# Patient Record
Sex: MALE | Race: WHITE | Employment: FULL TIME | URBAN - NONMETROPOLITAN AREA
[De-identification: names, ages, dates, MRNs, and addresses within clinical notes are randomized per-mention and may not be internally consistent; named-entity substitution may affect disease eponyms.]

---

## 2017-06-29 ENCOUNTER — OFFICE VISIT (OUTPATIENT)
Dept: URGENT CARE | Age: 47
End: 2017-06-29
Payer: MEDICAID

## 2017-06-29 VITALS
HEIGHT: 70 IN | OXYGEN SATURATION: 97 % | RESPIRATION RATE: 20 BRPM | TEMPERATURE: 98.5 F | HEART RATE: 106 BPM | DIASTOLIC BLOOD PRESSURE: 108 MMHG | WEIGHT: 202.4 LBS | SYSTOLIC BLOOD PRESSURE: 168 MMHG | BODY MASS INDEX: 28.98 KG/M2

## 2017-06-29 DIAGNOSIS — J06.9 VIRAL URI WITH COUGH: Primary | ICD-10-CM

## 2017-06-29 PROCEDURE — 99213 OFFICE O/P EST LOW 20 MIN: CPT | Performed by: NURSE PRACTITIONER

## 2017-06-29 RX ORDER — FLUTICASONE PROPIONATE 50 MCG
1 SPRAY, SUSPENSION (ML) NASAL DAILY
Qty: 1 BOTTLE | Refills: 0 | Status: SHIPPED | OUTPATIENT
Start: 2017-06-29 | End: 2018-01-09 | Stop reason: CLARIF

## 2017-06-29 RX ORDER — BENZONATATE 100 MG/1
100 CAPSULE ORAL 3 TIMES DAILY PRN
Qty: 21 CAPSULE | Refills: 0 | Status: SHIPPED | OUTPATIENT
Start: 2017-06-29 | End: 2017-07-06

## 2017-06-29 RX ORDER — LORATADINE 10 MG/1
10 TABLET ORAL DAILY
Qty: 30 TABLET | Refills: 0 | Status: SHIPPED | OUTPATIENT
Start: 2017-06-29 | End: 2022-01-17

## 2017-06-29 ASSESSMENT — ENCOUNTER SYMPTOMS
SORE THROAT: 1
SINUS PRESSURE: 1
COUGH: 1
RHINORRHEA: 1

## 2018-01-09 ENCOUNTER — OFFICE VISIT (OUTPATIENT)
Dept: URGENT CARE | Age: 48
End: 2018-01-09
Payer: MEDICAID

## 2018-01-09 VITALS
DIASTOLIC BLOOD PRESSURE: 80 MMHG | TEMPERATURE: 97.5 F | HEART RATE: 99 BPM | SYSTOLIC BLOOD PRESSURE: 134 MMHG | OXYGEN SATURATION: 98 % | RESPIRATION RATE: 20 BRPM | HEIGHT: 70 IN | BODY MASS INDEX: 29.49 KG/M2 | WEIGHT: 206 LBS

## 2018-01-09 DIAGNOSIS — J01.10 ACUTE NON-RECURRENT FRONTAL SINUSITIS: Primary | ICD-10-CM

## 2018-01-09 PROCEDURE — 99213 OFFICE O/P EST LOW 20 MIN: CPT | Performed by: NURSE PRACTITIONER

## 2018-01-09 RX ORDER — FLUTICASONE PROPIONATE 50 MCG
1 SPRAY, SUSPENSION (ML) NASAL DAILY
Qty: 1 BOTTLE | Refills: 3 | Status: SHIPPED | OUTPATIENT
Start: 2018-01-09 | End: 2022-01-17

## 2018-01-09 RX ORDER — METHYLPREDNISOLONE 4 MG/1
TABLET ORAL
Qty: 1 KIT | Refills: 0 | Status: SHIPPED | OUTPATIENT
Start: 2018-01-09 | End: 2018-01-15

## 2018-01-09 ASSESSMENT — ENCOUNTER SYMPTOMS
TROUBLE SWALLOWING: 0
SINUS PRESSURE: 1
VOICE CHANGE: 0
ALLERGIC/IMMUNOLOGIC NEGATIVE: 1
COUGH: 1
GASTROINTESTINAL NEGATIVE: 1
EYES NEGATIVE: 1
SHORTNESS OF BREATH: 0
RHINORRHEA: 0
SORE THROAT: 0

## 2018-01-09 NOTE — PROGRESS NOTES
[address] [facility] E CARE  [address] [place] [address]  [address] [place] [id number]  Dept: [phone]  Loc: [phone]    Izabel Bishop is a 52 y.o. male who presents today for his medical conditions/complaints as noted below. Izabel Bishop is c/o of Cough (x2 weeks)        HPI:     HPI   Pt presents to clinic with c/o sinus congestion for a week. Denies face pain, h/a. States he has tried dayquil and mucinex. Denies fever, SOB, sore throat. Denies treatment. Denies any other symptoms. No results found for this visit on 01/09/18. History reviewed. No pertinent past medical history. Past Surgical History:   Procedure Laterality Date    WISDOM TOOTH EXTRACTION         Family History   Problem Relation Age of Onset    Breast Cancer Mother     Lung Cancer Maternal Uncle     Diabetes Maternal Grandmother     Brain Cancer Paternal Grandfather        Social History   Substance Use Topics    Smoking status: Current Every Day Smoker     Packs/day: 0.50     Years: 7.00     Types: Cigarettes    Smokeless tobacco: Never Used    Alcohol use Yes      Comment: Rare      Current Outpatient Prescriptions   Medication Sig Dispense Refill    methylPREDNISolone (MEDROL, RAGHU,) 4 MG tablet Take by mouth. 1 kit 0    fluticasone (FLONASE) 50 MCG/ACT nasal spray 1 spray by Nasal route daily 1 Bottle 3    loratadine (CLARITIN) 10 MG tablet Take 1 tablet by mouth daily 30 tablet 0     No current facility-administered medications for this visit.       Allergies   Allergen Reactions    Amoxicillin Nausea And Vomiting    Other      Pain medication unknown to patient       Health Maintenance   Topic Date Due    HIV screen  01/20/1985    DTaP/Tdap/Td vaccine (1 - Tdap) 01/20/1989    Pneumococcal med risk (1 of 1 - PPSV23) 01/20/1989    Lipid screen  01/20/2010    Diabetes screen  01/20/2010    Flu vaccine (1) 09/01/2017       Subjective:      Review of Systems time.   Skin: Skin is warm and intact. No rash noted. Psychiatric: He has a normal mood and affect. His speech is normal and behavior is normal. Judgment and thought content normal. Cognition and memory are normal.   Vitals reviewed. /80   Pulse 99   Temp 97.5 °F (36.4 °C) (Oral)   Resp 20   Ht 5' 10\" (1.778 m)   Wt 206 lb (93.4 kg)   SpO2 98%   BMI 29.56 kg/m²     Assessment:      1. Acute non-recurrent frontal sinusitis  methylPREDNISolone (MEDROL, RAGHU,) 4 MG tablet    fluticasone (FLONASE) 50 MCG/ACT nasal spray       Plan:    No orders of the defined types were placed in this encounter. No Follow-up on file. No orders of the defined types were placed in this encounter. Orders Placed This Encounter   Medications    methylPREDNISolone (MEDROL, RAGHU,) 4 MG tablet     Sig: Take by mouth. Dispense:  1 kit     Refill:  0    fluticasone (FLONASE) 50 MCG/ACT nasal spray     Si spray by Nasal route daily     Dispense:  1 Bottle     Refill:  3       Patient given educational materials - see patient instructions. Discussed use, benefit, and side effects of prescribed medications. All patient questions answered. Pt voiced understanding. Reviewed health maintenance. Instructed to continue current medications, diet and exercise. Patient agreed with treatment plan. Follow up as directed. There are no Patient Instructions on file for this visit.       Electronically signed by Nolvia Overton CNP on 2018 at 5:45 PM

## 2021-10-04 ENCOUNTER — APPOINTMENT (OUTPATIENT)
Dept: CT IMAGING | Age: 51
End: 2021-10-04
Payer: MEDICAID

## 2021-10-04 ENCOUNTER — APPOINTMENT (OUTPATIENT)
Dept: GENERAL RADIOLOGY | Age: 51
End: 2021-10-04
Payer: MEDICAID

## 2021-10-04 ENCOUNTER — HOSPITAL ENCOUNTER (EMERGENCY)
Age: 51
Discharge: HOME | End: 2021-10-04
Attending: PEDIATRICS
Payer: MEDICAID

## 2021-10-04 ENCOUNTER — OFFICE VISIT (OUTPATIENT)
Dept: URGENT CARE | Age: 51
End: 2021-10-04

## 2021-10-04 VITALS
BODY MASS INDEX: 30.03 KG/M2 | TEMPERATURE: 97.6 F | RESPIRATION RATE: 22 BRPM | HEART RATE: 73 BPM | SYSTOLIC BLOOD PRESSURE: 198 MMHG | DIASTOLIC BLOOD PRESSURE: 105 MMHG | HEIGHT: 70 IN | OXYGEN SATURATION: 98 % | WEIGHT: 209.8 LBS

## 2021-10-04 VITALS
TEMPERATURE: 98.6 F | SYSTOLIC BLOOD PRESSURE: 175 MMHG | HEART RATE: 71 BPM | OXYGEN SATURATION: 95 % | DIASTOLIC BLOOD PRESSURE: 106 MMHG | HEIGHT: 70 IN | BODY MASS INDEX: 29.92 KG/M2 | WEIGHT: 209 LBS | RESPIRATION RATE: 16 BRPM

## 2021-10-04 DIAGNOSIS — R51.9 HEADACHE IN BACK OF HEAD: Primary | ICD-10-CM

## 2021-10-04 DIAGNOSIS — I10 HYPERTENSION, UNSPECIFIED TYPE: Primary | ICD-10-CM

## 2021-10-04 DIAGNOSIS — N20.1 URETEROLITHIASIS: ICD-10-CM

## 2021-10-04 LAB
ALBUMIN SERPL-MCNC: 4.7 G/DL (ref 3.5–5.2)
ALP BLD-CCNC: 78 U/L (ref 40–130)
ALT SERPL-CCNC: 50 U/L (ref 5–41)
ANION GAP SERPL CALCULATED.3IONS-SCNC: 8 MMOL/L (ref 7–19)
AST SERPL-CCNC: 26 U/L (ref 5–40)
BACTERIA: NEGATIVE /HPF
BASOPHILS ABSOLUTE: 0.1 K/UL (ref 0–0.2)
BASOPHILS RELATIVE PERCENT: 0.8 % (ref 0–1)
BILIRUB SERPL-MCNC: 0.5 MG/DL (ref 0.2–1.2)
BILIRUBIN URINE: NEGATIVE
BLOOD, URINE: ABNORMAL
BUN BLDV-MCNC: 17 MG/DL (ref 6–20)
CALCIUM SERPL-MCNC: 9.4 MG/DL (ref 8.6–10)
CHLORIDE BLD-SCNC: 103 MMOL/L (ref 98–111)
CLARITY: CLEAR
CO2: 27 MMOL/L (ref 22–29)
COLOR: YELLOW
CREAT SERPL-MCNC: 0.8 MG/DL (ref 0.5–1.2)
CRYSTALS, UA: ABNORMAL /HPF
EKG P AXIS: 4 DEGREES
EKG P-R INTERVAL: 126 MS
EKG Q-T INTERVAL: 378 MS
EKG QRS DURATION: 112 MS
EKG QTC CALCULATION (BAZETT): 413 MS
EKG T AXIS: 38 DEGREES
EOSINOPHILS ABSOLUTE: 0.5 K/UL (ref 0–0.6)
EOSINOPHILS RELATIVE PERCENT: 4 % (ref 0–5)
EPITHELIAL CELLS, UA: 0 /HPF (ref 0–5)
GFR AFRICAN AMERICAN: >59
GFR NON-AFRICAN AMERICAN: >60
GLUCOSE BLD-MCNC: 98 MG/DL (ref 74–109)
GLUCOSE URINE: NEGATIVE MG/DL
HCT VFR BLD CALC: 46.6 % (ref 42–52)
HEMOGLOBIN: 16.1 G/DL (ref 14–18)
HYALINE CASTS: 1 /HPF (ref 0–8)
IMMATURE GRANULOCYTES #: 0.1 K/UL
KETONES, URINE: NEGATIVE MG/DL
LEUKOCYTE ESTERASE, URINE: ABNORMAL
LYMPHOCYTES ABSOLUTE: 3.2 K/UL (ref 1.1–4.5)
LYMPHOCYTES RELATIVE PERCENT: 25.5 % (ref 20–40)
MCH RBC QN AUTO: 32.3 PG (ref 27–31)
MCHC RBC AUTO-ENTMCNC: 34.5 G/DL (ref 33–37)
MCV RBC AUTO: 93.6 FL (ref 80–94)
MONOCYTES ABSOLUTE: 1.2 K/UL (ref 0–0.9)
MONOCYTES RELATIVE PERCENT: 9.4 % (ref 0–10)
NEUTROPHILS ABSOLUTE: 7.4 K/UL (ref 1.5–7.5)
NEUTROPHILS RELATIVE PERCENT: 59.7 % (ref 50–65)
NITRITE, URINE: NEGATIVE
PDW BLD-RTO: 12 % (ref 11.5–14.5)
PH UA: 6.5 (ref 5–8)
PLATELET # BLD: 312 K/UL (ref 130–400)
PMV BLD AUTO: 9.3 FL (ref 9.4–12.4)
POTASSIUM REFLEX MAGNESIUM: 4.1 MMOL/L (ref 3.5–5)
PRO-BNP: 253 PG/ML (ref 0–900)
PROTEIN UA: ABNORMAL MG/DL
RBC # BLD: 4.98 M/UL (ref 4.7–6.1)
RBC UA: 10 /HPF (ref 0–4)
REASON FOR REJECTION: NORMAL
REJECTED TEST: NORMAL
SODIUM BLD-SCNC: 138 MMOL/L (ref 136–145)
SPECIFIC GRAVITY UA: 1.02 (ref 1–1.03)
TOTAL PROTEIN: 7.1 G/DL (ref 6.6–8.7)
TROPONIN: <0.01 NG/ML (ref 0–0.03)
UROBILINOGEN, URINE: 1 E.U./DL
WBC # BLD: 12.4 K/UL (ref 4.8–10.8)
WBC UA: 6 /HPF (ref 0–5)

## 2021-10-04 PROCEDURE — 70450 CT HEAD/BRAIN W/O DYE: CPT

## 2021-10-04 PROCEDURE — 99283 EMERGENCY DEPT VISIT LOW MDM: CPT

## 2021-10-04 PROCEDURE — 81001 URINALYSIS AUTO W/SCOPE: CPT

## 2021-10-04 PROCEDURE — 71046 X-RAY EXAM CHEST 2 VIEWS: CPT

## 2021-10-04 PROCEDURE — 6370000000 HC RX 637 (ALT 250 FOR IP): Performed by: PEDIATRICS

## 2021-10-04 PROCEDURE — 84484 ASSAY OF TROPONIN QUANT: CPT

## 2021-10-04 PROCEDURE — 93010 ELECTROCARDIOGRAM REPORT: CPT | Performed by: INTERNAL MEDICINE

## 2021-10-04 PROCEDURE — 99999 PR OFFICE/OUTPT VISIT,PROCEDURE ONLY: CPT | Performed by: NURSE PRACTITIONER

## 2021-10-04 PROCEDURE — 93005 ELECTROCARDIOGRAM TRACING: CPT | Performed by: PEDIATRICS

## 2021-10-04 PROCEDURE — 83880 ASSAY OF NATRIURETIC PEPTIDE: CPT

## 2021-10-04 PROCEDURE — 74176 CT ABD & PELVIS W/O CONTRAST: CPT

## 2021-10-04 PROCEDURE — 80053 COMPREHEN METABOLIC PANEL: CPT

## 2021-10-04 PROCEDURE — 85025 COMPLETE CBC W/AUTO DIFF WBC: CPT

## 2021-10-04 PROCEDURE — 36415 COLL VENOUS BLD VENIPUNCTURE: CPT

## 2021-10-04 RX ORDER — TAMSULOSIN HYDROCHLORIDE 0.4 MG/1
0.4 CAPSULE ORAL DAILY
Qty: 20 CAPSULE | Refills: 0 | Status: SHIPPED | OUTPATIENT
Start: 2021-10-04

## 2021-10-04 RX ORDER — CLONIDINE HYDROCHLORIDE 0.1 MG/1
0.1 TABLET ORAL ONCE
Status: COMPLETED | OUTPATIENT
Start: 2021-10-04 | End: 2021-10-04

## 2021-10-04 RX ORDER — LISINOPRIL 10 MG/1
10 TABLET ORAL DAILY
Qty: 30 TABLET | Refills: 0 | Status: SHIPPED | OUTPATIENT
Start: 2021-10-04 | End: 2022-01-17

## 2021-10-04 RX ADMIN — CLONIDINE HYDROCHLORIDE 0.1 MG: 0.1 TABLET ORAL at 13:06

## 2021-10-04 ASSESSMENT — ENCOUNTER SYMPTOMS
COLOR CHANGE: 0
NAUSEA: 0
VOMITING: 0
EYE DISCHARGE: 0
COUGH: 0
RHINORRHEA: 0
ABDOMINAL PAIN: 0
BACK PAIN: 0
SHORTNESS OF BREATH: 1

## 2021-10-04 ASSESSMENT — PAIN SCALES - GENERAL: PAINLEVEL_OUTOF10: 4

## 2021-10-04 NOTE — PROGRESS NOTES
Pt presents with /105 with headache, dizziness, face tingling. Advised ER for further eval. Pt agreed to plan. Left in stable condition. Refused ambulance.

## 2021-10-04 NOTE — ED PROVIDER NOTES
Negative for congestion and rhinorrhea. Eyes: Negative for discharge. Respiratory: Positive for shortness of breath (Patient states he is using 4 pillows because he gets short of breath at night.). Negative for cough. Cardiovascular: Negative for chest pain and palpitations. Gastrointestinal: Negative for abdominal pain, nausea and vomiting. Genitourinary: Negative for difficulty urinating and dysuria. Musculoskeletal: Positive for neck pain. Negative for back pain. Bilateral upper extremity pain intermittently   Skin: Negative for color change and pallor. Neurological: Positive for headaches. Negative for syncope, facial asymmetry, speech difficulty, weakness, light-headedness and numbness. Psychiatric/Behavioral: Negative for agitation and decreased concentration. All other systems reviewed and are negative. PAST MEDICALHISTORY   No past medical history on file.       SURGICAL HISTORY       Past Surgical History:   Procedure Laterality Date    WISDOM TOOTH EXTRACTION           CURRENT MEDICATIONS     Discharge Medication List as of 10/4/2021  5:04 PM      CONTINUE these medications which have NOT CHANGED    Details   fluticasone (FLONASE) 50 MCG/ACT nasal spray 1 spray by Nasal route daily, Disp-1 Bottle, R-3Normal      loratadine (CLARITIN) 10 MG tablet Take 1 tablet by mouth daily, Disp-30 tablet, R-0Normal             ALLERGIES     Amoxicillin and Other    FAMILY HISTORY       Family History   Problem Relation Age of Onset    Breast Cancer Mother     Lung Cancer Maternal Uncle     Diabetes Maternal Grandmother     Brain Cancer Paternal Grandfather           SOCIAL HISTORY       Social History     Socioeconomic History    Marital status:      Spouse name: Not on file    Number of children: Not on file    Years of education: Not on file    Highest education level: Not on file   Occupational History    Not on file   Tobacco Use    Smoking status: Current Every Day Smoker     Packs/day: 0.50     Years: 7.00     Pack years: 3.50     Types: Cigarettes    Smokeless tobacco: Never Used   Substance and Sexual Activity    Alcohol use: Yes     Comment: Rare    Drug use: Yes     Types: Marijuana    Sexual activity: Not on file   Other Topics Concern    Not on file   Social History Narrative    Not on file     Social Determinants of Health     Financial Resource Strain:     Difficulty of Paying Living Expenses:    Food Insecurity:     Worried About Running Out of Food in the Last Year:     Ran Out of Food in the Last Year:    Transportation Needs:     Lack of Transportation (Medical):  Lack of Transportation (Non-Medical):    Physical Activity:     Days of Exercise per Week:     Minutes of Exercise per Session:    Stress:     Feeling of Stress :    Social Connections:     Frequency of Communication with Friends and Family:     Frequency of Social Gatherings with Friends and Family:     Attends Jew Services:     Active Member of Clubs or Organizations:     Attends Club or Organization Meetings:     Marital Status:    Intimate Partner Violence:     Fear of Current or Ex-Partner:     Emotionally Abused:     Physically Abused:     Sexually Abused:        SCREENINGS             PHYSICAL EXAM    (up to 7 for level 4, 8 or more for level 5)     ED Triage Vitals [10/04/21 1152]   BP Temp Temp Source Pulse Resp SpO2 Height Weight   (!) 207/134 98.6 °F (37 °C) Oral 71 16 95 % 5' 10\" (1.778 m) 209 lb (94.8 kg)       Physical Exam  Vitals and nursing note reviewed. Constitutional:       General: He is not in acute distress. Appearance: Normal appearance. HENT:      Head: Normocephalic and atraumatic. Right Ear: External ear normal.      Left Ear: External ear normal.      Nose: Nose normal.      Mouth/Throat:      Mouth: Mucous membranes are moist.      Pharynx: Oropharynx is clear. No oropharyngeal exudate.    Eyes:      General: No scleral icterus. Conjunctiva/sclera: Conjunctivae normal.      Pupils: Pupils are equal, round, and reactive to light. Cardiovascular:      Rate and Rhythm: Normal rate and regular rhythm. Pulses: Normal pulses. Heart sounds: Normal heart sounds. Pulmonary:      Effort: Pulmonary effort is normal.      Breath sounds: Normal breath sounds. Abdominal:      General: Bowel sounds are normal.      Palpations: Abdomen is soft. Tenderness: There is no abdominal tenderness. There is no guarding. Musculoskeletal:         General: No tenderness or deformity. Cervical back: Neck supple. No rigidity. Right lower leg: No edema. Left lower leg: No edema. Skin:     General: Skin is warm and dry. Capillary Refill: Capillary refill takes less than 2 seconds. Coloration: Skin is not jaundiced. Neurological:      General: No focal deficit present. Mental Status: He is alert and oriented to person, place, and time. Mental status is at baseline. Coordination: Coordination normal.   Psychiatric:         Mood and Affect: Mood normal.         Behavior: Behavior normal.         DIAGNOSTIC RESULTS     EKG: All EKG's areinterpreted by the Emergency Department Physician who either signs or Co-signs this chart in the absence of a cardiologist.    EKG dated 10/4/2021 at 12:19 PM: Normal sinus rhythm, rate 81. Probable left ventricular hypertrophy. Left anterior fascicular block. T wave flattening in 3 and aVF. Early repolarization in V1 through V5.    RADIOLOGY:  Non-plain film images such as CT, Ultrasound and MRI are read by the radiologist. Plain radiographic images are visualized and preliminarily interpreted bythe emergency physician with the below findings:          CT ABDOMEN PELVIS WO CONTRAST Additional Contrast? None   Final Result   1.  A 5 x 4 mm right pelvic calcification appears to be within the   distal right ureter though there is no ureteral dilation or hydronephrosis. 2. Otherwise no acute abnormality is seen within the abdomen and   pelvis. Signed by Dr Javier Aviles      XR CHEST (2 VW)   Final Result   No active cardiopulmonary disease. Signed by Dr Diane Flores Contrast   Final Result   No acute intracranial abnormality. Signed by Dr Lobo Colon:  Iris Cords MG FOR LOW K - Abnormal; Notable for the following components:       Result Value    ALT 50 (*)     All other components within normal limits   URINE RT REFLEX TO CULTURE - Abnormal; Notable for the following components:    Blood, Urine SMALL (*)     Protein, UA TRACE (*)     Leukocyte Esterase, Urine SMALL (*)     All other components within normal limits   CBC WITH AUTO DIFFERENTIAL - Abnormal; Notable for the following components:    WBC 12.4 (*)     MCH 32.3 (*)     MPV 9.3 (*)     Monocytes Absolute 1.20 (*)     All other components within normal limits    Narrative:     RECOLLECT   MICROSCOPIC URINALYSIS - Abnormal; Notable for the following components:    Bacteria, UA NEGATIVE (*)     Crystals, UA NEG (*)     WBC, UA 6 (*)     RBC, UA 10 (*)     All other components within normal limits   TROPONIN   SPECIMEN REJECTION   BRAIN NATRIURETIC PEPTIDE       All other labs were within normal range or not returned as of this dictation. EMERGENCY DEPARTMENT COURSE and DIFFERENTIAL DIAGNOSIS/MDM:   Vitals:    Vitals:    10/04/21 1152 10/04/21 1306   BP: (!) 207/134 (!) 175/106   Pulse: 71    Resp: 16    Temp: 98.6 °F (37 °C)    TempSrc: Oral    SpO2: 95%    Weight: 209 lb (94.8 kg)    Height: 5' 10\" (1.778 m)        MDM  80-year-old male presents with hypertension and headache. Lab, EKG, radiology results reviewed. Patient also found to have right ureterolithiasis without dilation of kidney or ureter. Hypertension improved after clonidine 0.1 mg tab. Patient will follow up with Dr. Marquetta Burkitt, PCP. Patient given prescriptions for Flomax and lisinopril 10 mg daily. Patient will return with increasing or severe pain, difficulty speaking or walking, or other concerns. CONSULTS:  None    PROCEDURES:  Unless otherwise noted below, none     Procedures    FINAL IMPRESSION      1. Hypertension, unspecified type    2.  Ureterolithiasis          DISPOSITION/PLAN   DISPOSITION Decision To Discharge 10/04/2021 04:56:29 PM      PATIENT REFERRED TO:  Meg Daniels MD  [address] [place] [address] Dr [id number] [phone]    Schedule an appointment as soon as possible for a visit       Andre Vang MD  [address] [place] [address] [phone]    Schedule an appointment as soon as possible for a visit         DISCHARGE MEDICATIONS:  Discharge Medication List as of 10/4/2021  5:04 PM      START taking these medications    Details   tamsulosin (FLOMAX) 0.4 MG capsule Take 1 capsule by mouth daily, Disp-20 capsule, R-0Normal      lisinopril (PRINIVIL;ZESTRIL) 10 MG tablet Take 1 tablet by mouth daily, Disp-30 tablet, R-0Normal                (Please note that portions of this note were completed with a voice recognition program.  Efforts were made to edit thedictations but occasionally words are mis-transcribed.)    Humberto Malik MD (electronically signed)  Attending Emergency Physician          Humberto Malik MD  10/04/21 [id number]

## 2021-10-08 ENCOUNTER — TELEPHONE (OUTPATIENT)
Dept: UROLOGY | Age: 51
End: 2021-10-08

## 2021-10-08 NOTE — TELEPHONE ENCOUNTER
Called patient today to get him scheduled for his er follow up and he voiced he does want to schedule an appointment right now because he is not in any pain and has had stones before and passed them with no problems. He said he would call if he needed an appointment later down the road.

## 2021-10-20 LAB
ALBUMIN SERPL-MCNC: 4.6 G/DL (ref 3.5–5.2)
ALP BLD-CCNC: 79 U/L (ref 40–130)
ALT SERPL-CCNC: 49 U/L (ref 5–41)
ANION GAP SERPL CALCULATED.3IONS-SCNC: 14 MMOL/L (ref 7–19)
AST SERPL-CCNC: 25 U/L (ref 5–40)
BILIRUB SERPL-MCNC: 0.5 MG/DL (ref 0.2–1.2)
BUN BLDV-MCNC: 16 MG/DL (ref 6–20)
CALCIUM SERPL-MCNC: 9.6 MG/DL (ref 8.6–10)
CHLORIDE BLD-SCNC: 104 MMOL/L (ref 98–111)
CHOLESTEROL, TOTAL: 147 MG/DL (ref 160–199)
CO2: 22 MMOL/L (ref 22–29)
CREAT SERPL-MCNC: 0.8 MG/DL (ref 0.5–1.2)
GFR AFRICAN AMERICAN: >59
GFR NON-AFRICAN AMERICAN: >60
GLUCOSE BLD-MCNC: 94 MG/DL (ref 74–109)
HDLC SERPL-MCNC: 45 MG/DL (ref 55–121)
LDL CHOLESTEROL CALCULATED: 87 MG/DL
POTASSIUM SERPL-SCNC: 4.3 MMOL/L (ref 3.5–5)
SODIUM BLD-SCNC: 140 MMOL/L (ref 136–145)
TOTAL PROTEIN: 7.2 G/DL (ref 6.6–8.7)
TRIGL SERPL-MCNC: 74 MG/DL (ref 0–149)

## 2021-11-02 LAB
ALBUMIN SERPL-MCNC: 4.7 G/DL (ref 3.5–5.2)
ALP BLD-CCNC: 83 U/L (ref 40–130)
ALT SERPL-CCNC: 46 U/L (ref 5–41)
ANION GAP SERPL CALCULATED.3IONS-SCNC: 14 MMOL/L (ref 7–19)
AST SERPL-CCNC: 21 U/L (ref 5–40)
BASOPHILS ABSOLUTE: 0.1 K/UL (ref 0–0.2)
BASOPHILS RELATIVE PERCENT: 1 % (ref 0–1)
BILIRUB SERPL-MCNC: 0.4 MG/DL (ref 0.2–1.2)
BUN BLDV-MCNC: 25 MG/DL (ref 6–20)
CALCIUM SERPL-MCNC: 9.8 MG/DL (ref 8.6–10)
CHLORIDE BLD-SCNC: 100 MMOL/L (ref 98–111)
CO2: 23 MMOL/L (ref 22–29)
CREAT SERPL-MCNC: 1 MG/DL (ref 0.5–1.2)
EOSINOPHILS ABSOLUTE: 0.5 K/UL (ref 0–0.6)
EOSINOPHILS RELATIVE PERCENT: 3.8 % (ref 0–5)
GFR AFRICAN AMERICAN: >59
GFR NON-AFRICAN AMERICAN: >60
GLUCOSE BLD-MCNC: 116 MG/DL (ref 74–109)
HCT VFR BLD CALC: 47.4 % (ref 42–52)
HEMOGLOBIN: 16.2 G/DL (ref 14–18)
IMMATURE GRANULOCYTES #: 0.1 K/UL
LYMPHOCYTES ABSOLUTE: 3.6 K/UL (ref 1.1–4.5)
LYMPHOCYTES RELATIVE PERCENT: 28.4 % (ref 20–40)
MCH RBC QN AUTO: 32 PG (ref 27–31)
MCHC RBC AUTO-ENTMCNC: 34.2 G/DL (ref 33–37)
MCV RBC AUTO: 93.7 FL (ref 80–94)
MONOCYTES ABSOLUTE: 1.3 K/UL (ref 0–0.9)
MONOCYTES RELATIVE PERCENT: 10 % (ref 0–10)
NEUTROPHILS ABSOLUTE: 7.1 K/UL (ref 1.5–7.5)
NEUTROPHILS RELATIVE PERCENT: 56.4 % (ref 50–65)
PDW BLD-RTO: 11.5 % (ref 11.5–14.5)
PLATELET # BLD: 350 K/UL (ref 130–400)
PMV BLD AUTO: 9.5 FL (ref 9.4–12.4)
POTASSIUM SERPL-SCNC: 4.8 MMOL/L (ref 3.5–5)
RBC # BLD: 5.06 M/UL (ref 4.7–6.1)
SODIUM BLD-SCNC: 137 MMOL/L (ref 136–145)
TOTAL PROTEIN: 7.4 G/DL (ref 6.6–8.7)
WBC # BLD: 12.7 K/UL (ref 4.8–10.8)

## 2021-11-15 ENCOUNTER — HOSPITAL ENCOUNTER (OUTPATIENT)
Dept: ULTRASOUND IMAGING | Age: 51
Discharge: HOME | End: 2021-11-15
Payer: MEDICAID

## 2021-11-15 DIAGNOSIS — I10 ESSENTIAL (PRIMARY) HYPERTENSION: ICD-10-CM

## 2021-11-15 PROCEDURE — 93975 VASCULAR STUDY: CPT

## 2021-12-15 DIAGNOSIS — D72.829 LEUKOCYTOSIS, UNSPECIFIED TYPE: Primary | ICD-10-CM

## 2021-12-15 NOTE — PROGRESS NOTES
OP HEMATOLOGY/ONCOLOGY CONSULTATION      Pt Name: Lesia Ruth: 1970  MRN: [id number]  Referring provider: MARYBETH Talbot  Requesting provider: Dr. Madhavi Griggs  Reason for consultation: Leukocytosis  Date of evaluation: 1/17/2022    History Obtained From:  patient, electronic medical record    CHIEF COMPLAINT:    Chief Complaint   Patient presents with    New Patient     elev wbc      HISTORY OF PRESENT ILLNESS:    Mady Sena is a 46 y.o.  male referred to the clinic by Dr. Madhavi Griggs for evaluation of chronic leukocytosis, noted on routine serology. Hematology consultation is performed 1/17/2022. PMH significant for hypertension, headaches (improved with blood pressure control), arthritis, former tobacco user    Labs 11/2/2021:   · CBC: WBC 12.7, Hgb 16.2/MCV 93.7, platelets 204,520. ANC 7.1, ALC 3.6, absolute monocytes 1.3 (0-0.90)  · CMP: ALT 46 (5-41), otherwise stable. Review of prior CBCs:      CBC 1/17/2022: WBC 13.1, Hgb 14.9/MCV 92.1, platelet count 389,365    Wally Mane recollects a longstanding history of leukocytosis, he states for the past 20 years. He tells me his WBC has ranged from approximately 11.5-13. He reports elevation when last followed by Dr. Charli Auguste as well. Wlaly Mane denies B symptoms. He denies recurrent infections. Wally Mane quit smoking approximately 4 months ago (9/2021). He has some arthralgias. He has not been tested for sleep apnea, though states he wakes himself up from snoring. Wally Mane is overweight with a BMI of 31.54. Potential causes of leukocytosis with monocytosis discussed. His chronic and likely reactive in nature. Baseline serology requested.     Past Medical History:   Diagnosis Date    Arthritis     BPH (benign prostatic hyperplasia)     Hypertension      Past Surgical History:   Procedure Laterality Date    WISDOM TOOTH EXTRACTION         Current Outpatient Medications:     lisinopril-hydroCHLOROthiazide (PRINZIDE;ZESTORETIC) 20-12.5 MG per tablet, Take 1 tablet by mouth daily , Disp: , Rfl:     cyclobenzaprine (FLEXERIL) 10 MG tablet, Take 10 mg by mouth 3 times daily as needed for Muscle spasms , Disp: , Rfl:     tamsulosin (FLOMAX) 0.4 MG capsule, Take 1 capsule by mouth daily, Disp: 20 capsule, Rfl: 0   Allergies: Allergies   Allergen Reactions    Amoxicillin Nausea And Vomiting    Other      Pain medication unknown to patient     Social History     Tobacco Use    Smoking status: Former Smoker     Packs/day: 0.50     Years: 1.00     Pack years: 0.50     Types: Cigarettes     Start date: 200     Quit date: 2021     Years since quittin.3    Smokeless tobacco: Never Used   Vaping Use    Vaping Use: Never used   Substance Use Topics    Alcohol use: Yes     Comment: Rare    Drug use: Yes     Types: Marijuana Deboraha Sanes)     Family History   Problem Relation Age of Onset    Breast Cancer Mother     Lung Cancer Maternal Uncle     Diabetes Maternal Grandmother     Brain Cancer Paternal Grandfather      Health Maintenance   Topic Date Due    Hepatitis C screen  Never done    COVID-19 Vaccine (1) Never done    Pneumococcal 0-64 years Vaccine (1 of 2 - PPSV23) Never done    Depression Screen  Never done    HIV screen  Never done    DTaP/Tdap/Td vaccine (1 - Tdap) Never done    Diabetes screen  Never done    Colon cancer screen colonoscopy  Never done    Shingles Vaccine (1 of 2) Never done    Flu vaccine (1) Never done    Potassium monitoring  2023    Creatinine monitoring  2023    Lipid screen  10/20/2026    Hepatitis A vaccine  Aged Out    Hepatitis B vaccine  Aged Out    Hib vaccine  Aged Out    Meningococcal (ACWY) vaccine  Aged Out     Subjective   Review of Systems   Constitutional: Negative for fatigue and fever. HENT: Negative for dental problem, hearing loss, mouth sores, nosebleeds, sore throat and trouble swallowing. Eyes: Negative for discharge and itching. Respiratory: Negative for cough, shortness of breath and wheezing. Cardiovascular: Negative for chest pain, palpitations and leg swelling. HTN   Gastrointestinal: Negative for abdominal pain, constipation, diarrhea, nausea and vomiting. Endocrine: Negative for cold intolerance and heat intolerance. Genitourinary: Negative for dysuria, frequency, hematuria and urgency. Musculoskeletal: Positive for arthralgias. Negative for joint swelling and myalgias. Skin: Negative for pallor and rash. Allergic/Immunologic: Negative for environmental allergies and immunocompromised state. Neurological: Positive for headaches (improved with b/p control). Negative for seizures, syncope and numbness. Hematological: Negative for adenopathy. Does not bruise/bleed easily. Psychiatric/Behavioral: Negative for agitation, behavioral problems and confusion. The patient is not nervous/anxious. Objective   Physical Exam  Vitals reviewed. Constitutional:       General: He is not in acute distress. Appearance: He is well-developed. He is not toxic-appearing or diaphoretic. Comments: Wearing a facial mask. HENT:      Head: Normocephalic and atraumatic. Right Ear: External ear normal.      Left Ear: External ear normal.      Nose: Nose normal.      Mouth/Throat:      Mouth: Mucous membranes are moist.   Eyes:      General: No scleral icterus. Right eye: No discharge. Left eye: No discharge. Conjunctiva/sclera: Conjunctivae normal.   Neck:      Trachea: No tracheal deviation. Cardiovascular:      Rate and Rhythm: Normal rate and regular rhythm. Pulmonary:      Effort: Pulmonary effort is normal. No respiratory distress. Breath sounds: Normal breath sounds. No wheezing or rales. Chest:   Breasts:      Right: No axillary adenopathy or supraclavicular adenopathy. Left: No axillary adenopathy or supraclavicular adenopathy.        Abdominal:      General: Bowel sounds are normal. There is no distension. Palpations: Abdomen is soft. There is no splenomegaly. Tenderness: There is no abdominal tenderness. There is no guarding. Genitourinary:     Comments: Exam deferred  Musculoskeletal:         General: No tenderness or deformity. Cervical back: Neck supple. No muscular tenderness. Comments: Normal ROM all four extremities   Lymphadenopathy:      Head:      Right side of head: No occipital adenopathy. Left side of head: No occipital adenopathy. Cervical:      Right cervical: No superficial or deep cervical adenopathy. Left cervical: No superficial or deep cervical adenopathy. Upper Body:      Right upper body: No supraclavicular or axillary adenopathy. Left upper body: No supraclavicular or axillary adenopathy. Lower Body: No right inguinal adenopathy. No left inguinal adenopathy. Comments:      Skin:     General: Skin is warm and dry. Findings: No rash. Neurological:      Mental Status: He is alert and oriented to person, place, and time. Comments: follows commands, non-focal   Psychiatric:         Behavior: Behavior normal. Behavior is cooperative. Thought Content: Thought content normal.         Judgment: Judgment normal.      Comments: Alert and oriented to person, place and time. /73   Pulse 106   Ht 5' 10\" (1.778 m)   Wt 219 lb 12.8 oz (99.7 kg)   SpO2 97%   BMI 31.54 kg/m²   Wt Readings from Last 3 Encounters:   01/17/22 219 lb 12.8 oz (99.7 kg)   10/04/21 209 lb (94.8 kg)   10/04/21 209 lb 12.8 oz (95.2 kg)     ASSESSMENT/PLAN:  Kristy Newsome was seen today for new patient.     Diagnoses and all orders for this visit:    Lymphocytosis with monocytosis  Lab Results   Component Value Date    WBC 13.10 (H) 01/17/2022    HGB 14.9 01/17/2022    HCT 43.2 01/17/2022    MCV 92.1 01/17/2022     01/17/2022    LABLYMP 3.86 07/05/2011    LYMPHOPCT 26.6 01/03/2022    RBC 4.69 01/17/2022    MCH 31.8 01/17/2022    MCHC 34.5 01/17/2022    RDW 12.5 01/17/2022   ANC 8.2, ALC 3.6    Possible causes, including benign and malignant, of leukocytosis with monocytosis discussed including benign and malignant causes. Suspect reactive in nature    -     Miscellaneous Sendout 1; Future, PBS - hematogenix  -     C-Reactive Protein; Future  -     Sedimentation Rate; Future  -     Lactate Dehydrogenase; Future  -     Miscellaneous Sendout 1; Future BCR/ABL - hematogenix    Care plan discussed with patient    BMI 31.0-31.9,adult  Body mass index is 31.54 kg/m². Federal guidelines recommend that people under the age of 72 should have a BMI of 18.5-25 and people age 72 and older should have a BMI of 23-30. If yours is outside the range, we recommend you utilize a diet and exercise program to get yours into the range. We also recommend you speak with your primary care doctor should any specific advice be needed regarding special diets or programs which would be appropriate for your circumstances. Discussed overweight and impact on WBC    Health Maintenance  Defer routine, age appropriate tumor screenings to PCP    Return in about 3 weeks (around 2/7/2022) for follow up with MARYBETH Marie. I have seen, examined and reviewed this patient medication list, appropriate labs and imaging studies. I reviewed relevant medical records and others physicians notes. I discussed the plan of care with the patient. I answered all questions to the patients satisfaction. I have also reviewed the chief complaint (CC) and part of the history (History of Present Illness (HPI), Past Family Social History [facility]), or Review of Systems (ROS) and made changes when appropriated. Office note and labs completed by Dr. Marina Golden reviewed. Dictated utilizing Dragon transcription software.         [address] [facility] HospitalMARY[facility]ETH  1:49 PM  1/17/2022

## 2022-01-03 DIAGNOSIS — D72.829 LEUKOCYTOSIS, UNSPECIFIED TYPE: ICD-10-CM

## 2022-01-03 LAB
ALBUMIN SERPL-MCNC: 4.7 G/DL (ref 3.5–5.2)
ALP BLD-CCNC: 73 U/L (ref 40–130)
ALT SERPL-CCNC: 43 U/L (ref 5–41)
ANION GAP SERPL CALCULATED.3IONS-SCNC: 20 MMOL/L (ref 7–19)
AST SERPL-CCNC: 18 U/L (ref 5–40)
BASOPHILS ABSOLUTE: 0.1 K/UL (ref 0–0.2)
BASOPHILS RELATIVE PERCENT: 0.9 % (ref 0–1)
BILIRUB SERPL-MCNC: 0.3 MG/DL (ref 0.2–1.2)
BUN BLDV-MCNC: 25 MG/DL (ref 6–20)
CALCIUM SERPL-MCNC: 10.2 MG/DL (ref 8.6–10)
CHLORIDE BLD-SCNC: 105 MMOL/L (ref 98–111)
CO2: 20 MMOL/L (ref 22–29)
CREAT SERPL-MCNC: 1.1 MG/DL (ref 0.5–1.2)
EOSINOPHILS ABSOLUTE: 0.6 K/UL (ref 0–0.6)
EOSINOPHILS RELATIVE PERCENT: 4.4 % (ref 0–5)
GFR AFRICAN AMERICAN: >59
GFR NON-AFRICAN AMERICAN: >60
GLUCOSE BLD-MCNC: 112 MG/DL (ref 74–109)
HCT VFR BLD CALC: 44 % (ref 42–52)
HEMOGLOBIN: 14.7 G/DL (ref 14–18)
IMMATURE GRANULOCYTES #: 0.1 K/UL
LYMPHOCYTES ABSOLUTE: 3.6 K/UL (ref 1.1–4.5)
LYMPHOCYTES RELATIVE PERCENT: 26.6 % (ref 20–40)
MCH RBC QN AUTO: 30.9 PG (ref 27–31)
MCHC RBC AUTO-ENTMCNC: 33.4 G/DL (ref 33–37)
MCV RBC AUTO: 92.4 FL (ref 80–94)
MONOCYTES ABSOLUTE: 1.2 K/UL (ref 0–0.9)
MONOCYTES RELATIVE PERCENT: 8.5 % (ref 0–10)
NEUTROPHILS ABSOLUTE: 7.9 K/UL (ref 1.5–7.5)
NEUTROPHILS RELATIVE PERCENT: 58.7 % (ref 50–65)
PDW BLD-RTO: 12.1 % (ref 11.5–14.5)
PLATELET # BLD: 365 K/UL (ref 130–400)
PMV BLD AUTO: 9.5 FL (ref 9.4–12.4)
POTASSIUM SERPL-SCNC: 4.7 MMOL/L (ref 3.5–5)
RBC # BLD: 4.76 M/UL (ref 4.7–6.1)
SODIUM BLD-SCNC: 145 MMOL/L (ref 136–145)
TOTAL PROTEIN: 7.2 G/DL (ref 6.6–8.7)
WBC # BLD: 13.5 K/UL (ref 4.8–10.8)

## 2022-01-17 ENCOUNTER — OFFICE VISIT (OUTPATIENT)
Dept: HEMATOLOGY | Age: 52
End: 2022-01-17
Payer: MEDICAID

## 2022-01-17 ENCOUNTER — HOSPITAL ENCOUNTER (OUTPATIENT)
Dept: INFUSION THERAPY | Age: 52
Discharge: HOME | End: 2022-01-17
Payer: MEDICAID

## 2022-01-17 VITALS
SYSTOLIC BLOOD PRESSURE: 120 MMHG | WEIGHT: 219.8 LBS | OXYGEN SATURATION: 97 % | DIASTOLIC BLOOD PRESSURE: 73 MMHG | HEART RATE: 106 BPM | BODY MASS INDEX: 31.47 KG/M2 | HEIGHT: 70 IN

## 2022-01-17 DIAGNOSIS — D72.829 LEUKOCYTOSIS, UNSPECIFIED TYPE: Primary | ICD-10-CM

## 2022-01-17 DIAGNOSIS — D72.829 LEUKOCYTOSIS, UNSPECIFIED TYPE: ICD-10-CM

## 2022-01-17 DIAGNOSIS — D72.821 MONOCYTOSIS: Primary | ICD-10-CM

## 2022-01-17 DIAGNOSIS — Z71.89 CARE PLAN DISCUSSED WITH PATIENT: ICD-10-CM

## 2022-01-17 DIAGNOSIS — D72.821 MONOCYTOSIS: ICD-10-CM

## 2022-01-17 LAB
C-REACTIVE PROTEIN: 0.44 MG/DL (ref 0–0.5)
HCT VFR BLD CALC: 43.2 % (ref 40.1–51)
HEMOGLOBIN: 14.9 G/DL (ref 13.7–17.5)
LACTATE DEHYDROGENASE: 435 U/L (ref 313–618)
MCH RBC QN AUTO: 31.8 PG (ref 25.7–32.2)
MCHC RBC AUTO-ENTMCNC: 34.5 G/DL (ref 32.3–36.5)
MCV RBC AUTO: 92.1 FL (ref 79–92.2)
PDW BLD-RTO: 12.5 % (ref 11.6–14.4)
PLATELET # BLD: 312 K/UL (ref 163–337)
PMV BLD AUTO: 9 FL (ref 7.4–10.4)
RBC # BLD: 4.69 M/UL (ref 4.63–6.08)
SEDIMENTATION RATE, ERYTHROCYTE: 3 MM/HR (ref 0–15)
WBC # BLD: 13.1 K/UL (ref 4.23–9.07)

## 2022-01-17 PROCEDURE — 83615 LACTATE (LD) (LDH) ENZYME: CPT

## 2022-01-17 PROCEDURE — 99212 OFFICE O/P EST SF 10 MIN: CPT

## 2022-01-17 PROCEDURE — 99203 OFFICE O/P NEW LOW 30 MIN: CPT | Performed by: NURSE PRACTITIONER

## 2022-01-17 PROCEDURE — 85027 COMPLETE CBC AUTOMATED: CPT

## 2022-01-17 RX ORDER — CYCLOBENZAPRINE HCL 10 MG
10 TABLET ORAL 3 TIMES DAILY PRN
COMMUNITY
Start: 2022-01-10 | End: 2022-03-11

## 2022-01-17 RX ORDER — LISINOPRIL AND HYDROCHLOROTHIAZIDE 20; 12.5 MG/1; MG/1
1 TABLET ORAL DAILY
COMMUNITY
Start: 2021-10-25 | End: 2022-02-22

## 2022-01-17 ASSESSMENT — ENCOUNTER SYMPTOMS
CONSTIPATION: 0
TROUBLE SWALLOWING: 0
DIARRHEA: 0
EYE DISCHARGE: 0
NAUSEA: 0
ABDOMINAL PAIN: 0
SHORTNESS OF BREATH: 0
COUGH: 0
EYE ITCHING: 0
SORE THROAT: 0
WHEEZING: 0
VOMITING: 0

## 2022-04-04 LAB
ALBUMIN SERPL-MCNC: 4.6 G/DL (ref 3.5–5.2)
ALP BLD-CCNC: 60 U/L (ref 40–130)
ALT SERPL-CCNC: 66 U/L (ref 5–41)
ANION GAP SERPL CALCULATED.3IONS-SCNC: 13 MMOL/L (ref 7–19)
AST SERPL-CCNC: 30 U/L (ref 5–40)
BILIRUB SERPL-MCNC: 0.3 MG/DL (ref 0.2–1.2)
BUN BLDV-MCNC: 19 MG/DL (ref 6–20)
CALCIUM SERPL-MCNC: 9.5 MG/DL (ref 8.6–10)
CHLORIDE BLD-SCNC: 98 MMOL/L (ref 98–111)
CO2: 24 MMOL/L (ref 22–29)
CREAT SERPL-MCNC: 1.2 MG/DL (ref 0.5–1.2)
GFR AFRICAN AMERICAN: >59
GFR NON-AFRICAN AMERICAN: >60
GLUCOSE BLD-MCNC: 88 MG/DL (ref 74–109)
POTASSIUM SERPL-SCNC: 4.3 MMOL/L (ref 3.5–5)
PROSTATE SPECIFIC ANTIGEN: 2.9 NG/ML (ref 0–4)
SODIUM BLD-SCNC: 135 MMOL/L (ref 136–145)
TOTAL PROTEIN: 7.1 G/DL (ref 6.6–8.7)

## 2022-04-18 ENCOUNTER — HOSPITAL ENCOUNTER (OUTPATIENT)
Dept: ULTRASOUND IMAGING | Age: 52
Discharge: HOME | End: 2022-04-18
Payer: MEDICAID

## 2022-04-18 ENCOUNTER — HOSPITAL ENCOUNTER (OUTPATIENT)
Dept: PHYSICAL THERAPY | Age: 52
Setting detail: THERAPIES SERIES
Discharge: HOME | End: 2022-04-18
Payer: MEDICAID

## 2022-04-18 DIAGNOSIS — R94.5 NONSPECIFIC ABNORMAL RESULTS OF LIVER FUNCTION STUDY: ICD-10-CM

## 2022-04-18 PROCEDURE — 76700 US EXAM ABDOM COMPLETE: CPT

## 2022-04-18 PROCEDURE — 97162 PT EVAL MOD COMPLEX 30 MIN: CPT

## 2022-04-18 ASSESSMENT — PAIN DESCRIPTION - PAIN TYPE: TYPE: ACUTE PAIN

## 2022-04-18 ASSESSMENT — PAIN DESCRIPTION - ORIENTATION: ORIENTATION: LEFT

## 2022-04-18 ASSESSMENT — PAIN DESCRIPTION - LOCATION: LOCATION: OTHER (COMMENT);NECK

## 2022-04-18 ASSESSMENT — PAIN DESCRIPTION - DESCRIPTORS: DESCRIPTORS: ACHING

## 2022-04-18 ASSESSMENT — PAIN DESCRIPTION - FREQUENCY: FREQUENCY: INTERMITTENT

## 2022-04-20 ENCOUNTER — HOSPITAL ENCOUNTER (OUTPATIENT)
Dept: PHYSICAL THERAPY | Age: 52
Setting detail: THERAPIES SERIES
Discharge: HOME | End: 2022-04-20
Payer: MEDICAID

## 2022-04-20 PROCEDURE — 97110 THERAPEUTIC EXERCISES: CPT

## 2022-04-20 ASSESSMENT — PAIN DESCRIPTION - DESCRIPTORS: DESCRIPTORS: ACHING

## 2022-04-20 ASSESSMENT — PAIN DESCRIPTION - PAIN TYPE
TYPE: ACUTE PAIN
TYPE: ACUTE PAIN

## 2022-04-20 ASSESSMENT — PAIN DESCRIPTION - LOCATION
LOCATION: NECK;OTHER (COMMENT)
LOCATION: OTHER (COMMENT)

## 2022-04-20 ASSESSMENT — PAIN DESCRIPTION - FREQUENCY: FREQUENCY: INTERMITTENT

## 2022-04-20 ASSESSMENT — PAIN DESCRIPTION - ORIENTATION
ORIENTATION: LEFT
ORIENTATION: LEFT

## 2022-04-20 NOTE — PROGRESS NOTES
Physical Therapy  Initial Assessment  Date: 2022  Patient Name: Osito Velazquez  MRN: [id number]  : 1970     Treatment Diagnosis: L scapular pain    Restrictions       Subjective   General  Chart Reviewed: Yes  Patient assessed for rehabilitation services?: Yes  Additional Pertinent Hx: 47 y/o M presents with complaint of back pain. PMH includes HTN, OA  Response To Previous Treatment: Not applicable  Family / Caregiver Present: No  Referring Practitioner: Sophie Garcia MD  Referral Date : 22  Diagnosis: Scapular pain  Follows Commands: Within Functional Limits  PT Visit Information  PT Insurance Information: BCBS Medicaid (no precert until 20 visits)  Total # of Visits Approved:  (Anticipate 8-12 visits)  Total # of Visits to Date: 1  Plan of Care/Certification Expiration Date: 22  Progress Note Due Date: 22  Subjective  Subjective: Presents with complaint of L scapular area pain, present for approx 2 months. Denies any injury. He notes that sitting with shoulders protracted increases pain- so does increased activity. He has lessened pain with rest, as well as sitting leaned back on a chair. Has tried muscle relaxers, which offer a small amount of relief, but nothing lasting. He also reports hypersensitivity to touch over the same area, and at times, pain into L upper trap and behind ear. Pain occasional radiates into deltoid area, but he denies any discomfort in [facility] joint. Pain Screening  Read Only-Patient Currently in Pain: Yes  Pain Assessment  Pain Assessment: 0-10  Pain Location: Neck; Other (Comment) (Scapula)  Pain Orientation: Left  Pain Descriptors: Aching  Pain Type: Acute pain  Pain Frequency: Intermittent    Vision/Hearing  Vision  Vision: Within Functional Limits  Hearing  Hearing: Within functional limits    Orientation  Orientation  Overall Orientation Status: Within Normal Limits  Follows Commands: Within Functional Limits           Objective Observation/Palpation  Palpation: TTP at medial scapular border with trigger points in same area. TTP with hypersensitivity over infraspinatus. No TTP at scapular angle, teres area. Observation: L shoulder elevated as compared to R. Rounded shoulders bilaterally with mild fwd head. Guardes L shoulder/scapula with upper trap engagement.     AROM RUE (degrees)  RUE AROM : WNL  AROM LUE (degrees)  LUE AROM : WFL  LUE General AROM: 165 degrees L shoulder flexion  Spine  Cervical: WNL    Strength RUE  R Shoulder Flexion: 5/5  R Shoulder ABduction: 5/5  R Shoulder Internal Rotation: 5/5  R Shoulder External Rotation: 5/5  R Elbow Flexion: 5/5  R Elbow Extension: 5/5  Strength LUE  L Shoulder Flexion: 5/5  L Shoulder ABduction: 5/5  L Shoulder Internal Rotation: 5/5  L Shoulder External Rotation: 5/5  L Elbow Flexion: 5/5  L Elbow Extension: 5/5     Additional Measures  Special Tests: (-)Spurling bilaterally, (-)Lift off L, (-)Empty/full can L  Other: Oswestry Disability Questionnaire: 18% impairment  Sensation  Overall Sensation Status: WFL (Hypersensitivity over L trap area)                   Exercises  Exercise 1: Pulleys  Exercise 2: Rhomboid stretch on pole (varying heights)  Exercise 3: L lower trap stretch (start with LUE in scaption, keep straight elbow, and rotate arm down to R hip)  Exercise 4: Rodriguez stretch (LUE pulls string)  Exercise 5: L stretch (LUE up)  Exercise 6: Golfer's stretch L (both with thumb up and thumb down)  Exercise 7: Pec stretch on doorway bilat  Exercise 8: L upper trap stretch  Exercise 9: L levator stretch  Exercise 10: Chin tucks  Exercise 11: Subscap stretch with cane (LUE in shoulder flex/abd 90 degrees, shoulder ER, hold cane behind back and pull down with RUE)  Exercise 12: Overhead L lat stretch  Exercise 13: L sleeper stretch in sidelying/manual subscap stretch  Exercise 14: Prone thoracic PA mobs  Exercise 15: Prone I, T, Y  Exercise 16: Prone rows  Exercise 17: IASTM L neck and scapular area                      Assessment   Conditions Requiring Skilled Therapeutic Intervention  Body Structures, Functions, Activity Limitations Requiring Skilled Therapeutic Intervention: Decreased body mechanics; Decreased ROM; Decreased sensation;Decreased posture; Increased pain;Decreased tolerance to work activity  Assessment: Pt presents with with complaint of L scapular area pain and burning and demonstrates decreased ROM, increased pain, and hypersensitivity. Will benefit from skilled PT intervention to address listed impairments.   Treatment Diagnosis: L scapular pain  Therapy Prognosis: Good  Decision Making: Medium Complexity  History: See above  Exam: See above  Clinical Presentation: Evolving  Discharge Recommendations: Continue to assess pending progress  Activity Tolerance  Activity Tolerance: Patient tolerated evaluation without incident;Patient tolerated treatment well         Plan   Plan  Current Treatment Recommendations: Strengthening,ROM,Manual Therapy - Joint Manipulation,Manual Therapy - Soft Tissue Mobilization,Home exercise program,Modalities    Goals  Short Term Goals  Time Frame for Short term goals: 4-6 weeks  Short term goal 1: No further radiating pain in L shoulder  Short term goal 2: Demo equal upper trap engagement in sitting/standing (equal shoulder height)  Short term goal 3: Decrease burning and hypersensitivity in L scapular area by at least 50%  Short term goal 4: Improve Oswestry score to 8% impairment or better  Long Term Goals  Time Frame for Long term goals : 3-4 weeks  Long term goal 1: Independent with HEP  Long term goal 2: Improve L shoulder flexion in sit/stand to 180 degrees  Patient Goals   Patient goals : reduce L scapula pain       Therapy Time   Individual Concurrent Group Co-treatment   Time In 0915         Time Out 0945         Minutes 30            Electronically signed by Katia Atkins PT on 4/20/2022 at 9:37 AM

## 2022-04-25 ENCOUNTER — HOSPITAL ENCOUNTER (OUTPATIENT)
Dept: PHYSICAL THERAPY | Age: 52
Setting detail: THERAPIES SERIES
Discharge: HOME | End: 2022-04-25
Payer: MEDICAID

## 2022-04-25 PROCEDURE — 97110 THERAPEUTIC EXERCISES: CPT

## 2022-04-25 NOTE — PROGRESS NOTES
Physical Therapy  Daily Treatment Note  Date: 2022  Patient Name: Ce Welch  MRN: [id number]     :   1970      Subjective:      PT Visit Information  Total # of Visits to Date: 3  Plan of Care/Certification Expiration Date: 22  Progress Note Due Date: 22  Subjective: I am feeling much better today. Those pulleys have really helped at home. No pain this morning, just some stiffness. Pain Screening  Patient Currently in Pain: No         Treatment Activities:   Exercises  Exercise 1: Pulleys x 5'  Exercise 2: Rhomboid stretch on pole (varying heights) 5 x 10\"  Exercise 3: L lower trap stretch (start with LUE in scaption, keep straight elbow, and rotate arm down to R hip) 5 x 5\"  Exercise 4: Rodriguez stretch (LUE pulls string) 5 x 5\"  Exercise 5: L stretch (LUE up) 5 x 5\"  Exercise 6: Golfer's stretch L (both with thumb up and thumb down) 4 x 10\"  Exercise 7: Pec stretch on doorway bilat 10 sec x 3 reps  Exercise 8: L upper trap stretch 3 x 10\"  Exercise 9: L levator stretch 3 x 10\"  Exercise 10: Chin tucks x 10  Exercise 11: Subscap stretch with cane (LUE in shoulder flex/abd 90 degrees, shoulder ER, hold cane behind back and pull down/fwd with RUE) - NOT TODAY  Exercise 12: Overhead L lat stretch 4 x 10\"  Exercise 13: L sleeper stretch in sidelying/manual subscap stretch 5 sec x 5 reps  Exercise 14: Prone thoracic PA mobs NOT TODAY  Exercise 15: Prone I, T, Y x 10 ea  Exercise 16: Prone rows x 10  Exercise 17: IASTM L neck and scapular area x 5 min     Assessment:   Conditions Requiring Skilled Therapeutic Intervention  Body Structures, Functions, Activity Limitations Requiring Skilled Therapeutic Intervention: Decreased body mechanics; Decreased ROM; Decreased sensation;Decreased posture; Increased pain;Decreased tolerance to work activity  Assessment: Patient tolerates treatment well this date having no c/o increased pain throughout tx.  He shows improvement in his mobility with stretches this date compared to his previous txs. Some verbal and visual cues needed to ensure correct form/technique for max benefot. Patient reports improvement in his symptoms post tx this date. WIll continue with current POC and progress as he tolerates.   Treatment Diagnosis: L scapular pain  Activity Tolerance  Activity Tolerance: Patient tolerated treatment well        Goals:Short Term Goals  Time Frame for Short term goals: 4-6 weeks  Short term goal 1: No further radiating pain in L shoulder  Short term goal 2: Demo equal upper trap engagement in sitting/standing (equal shoulder height)  Short term goal 3: Decrease burning and hypersensitivity in L scapular area by at least 50%  Short term goal 4: Improve Oswestry score to 8% impairment or better  Long Term Goals  Time Frame for Long term goals : 3-4 weeks  Long term goal 1: Independent with HEP  Long term goal 2: Improve L shoulder flexion in sit/stand to 180 degrees  Patient Goals   Patient goals : reduce L scapula pain      Plan:    Plan  Plan weeks: 4-6 weeks  Current Treatment Recommendations: Strengthening,ROM,Manual Therapy - Joint Manipulation,Manual Therapy - Soft Tissue Mobilization,Home exercise program,Modalities          Therapy Time   Individual Concurrent Group Co-treatment   Time In 0900         Time Out 0943         Minutes 43                 Electronically signed by Whit Mendoza PTA on 4/25/2022 at 1:56 PM

## 2022-04-27 ENCOUNTER — HOSPITAL ENCOUNTER (OUTPATIENT)
Dept: PHYSICAL THERAPY | Age: 52
Setting detail: THERAPIES SERIES
Discharge: HOME | End: 2022-04-27
Payer: MEDICAID

## 2022-04-27 PROCEDURE — 97110 THERAPEUTIC EXERCISES: CPT

## 2022-04-27 NOTE — PROGRESS NOTES
Physical Therapy  Daily Treatment Note  Date: 2022  Patient Name: Jyoti Mckeon  MRN: [id number]     :   1970    Subjective:  Patient states he is feeling much better, \"about 80% better\", can sit up straight without support. He also states his sensitivity over the skin is improved. General  Additional Pertinent Hx: 45 y/o M presents with complaint of back pain. PMH includes HTN, OA  PT Visit Information  Total # of Visits to Date: 4  Plan of Care/Certification Expiration Date: 22  Progress Note Due Date: 22             Treatment Activities:                                    Exercises  Exercise 1: Pulleys x 5'  Exercise 2: Rhomboid stretch on pole (varying heights) 5 x 10\"  Exercise 3: L lower trap stretch (start with LUE in scaption, keep straight elbow, and rotate arm down to R hip) 5 x 5\"  Exercise 4: Rodriguez stretch (LUE pulls string) 5 x 5\"  Exercise 5: L stretch (LUE up) 5 x 5\"  Exercise 6: Golfer's stretch L (both with thumb up and thumb down) 4 x 10\"  Exercise 7: Pec stretch on doorway bilat 10 sec x 3 reps  Exercise 8: L upper trap stretch 3 x 10\"  Exercise 9: L levator stretch 3 x 10\"  Exercise 10: Chin tucks x 10  Exercise 11: Subscap stretch with cane (LUE in shoulder flex/abd 90 degrees, shoulder ER, hold cane behind back and pull down/fwd with RUE) - NOT TODAY  Exercise 12: Overhead L lat stretch 4 x 10\"  Exercise 13: L sleeper stretch in sidelying/manual subscap stretch 5 sec x 5 reps  Exercise 14: Prone thoracic PA mobs NOT TODAY  Exercise 15: Prone I, T, Y x 10 ea  Exercise 16: Prone rows x 10  Exercise 17: IASTM L neck and scapular area x 5 min--not today                               Assessment:   Conditions Requiring Skilled Therapeutic Intervention  Body Structures, Functions, Activity Limitations Requiring Skilled Therapeutic Intervention: Decreased body mechanics; Decreased ROM; Decreased sensation;Decreased posture; Increased pain;Decreased tolerance to work activity  Assessment: Mr. Jessie Francois appears to have made very good progress in a relatively short amount of time, conceding he is feeling much less tightness in his left scapular region, is able to use his left arm for more activities, and able to sit unsupported without as much discomfort. He appears to be responding well to all of the exercises in his routine.  Will continue with current routine, with no changes  made to his program.  Treatment Diagnosis: L scapular pain    Goals:  Short Term Goals  Time Frame for Short term goals: 4-6 weeks  Short term goal 1: No further radiating pain in L shoulder  Short term goal 2: Demo equal upper trap engagement in sitting/standing (equal shoulder height)  Short term goal 3: Decrease burning and hypersensitivity in L scapular area by at least 50%  Short term goal 4: Improve Oswestry score to 8% impairment or better  Long Term Goals  Time Frame for Long term goals : 3-4 weeks  Long term goal 1: Independent with HEP  Long term goal 2: Improve L shoulder flexion in sit/stand to 180 degrees  Patient Goals   Patient goals : reduce L scapula pain  Plan:    Plan  Plan weeks: 4-6 weeks  Current Treatment Recommendations: Strengthening,ROM,Manual Therapy - Joint Manipulation,Manual Therapy - Soft Tissue Mobilization,Home exercise program,Modalities  Timed Code Treatment Minutes: 46 Minutes     Therapy Time   Individual Concurrent Group Co-treatment   Time In 1601         Time Out 6487         Minutes 46         Timed Code Treatment Minutes: 46 Minutes  Electronically signed by Celestino Benedict PT on 4/27/2022 at 4:54 PM

## 2022-05-02 ENCOUNTER — HOSPITAL ENCOUNTER (OUTPATIENT)
Dept: PHYSICAL THERAPY | Age: 52
Setting detail: THERAPIES SERIES
Discharge: HOME | End: 2022-05-02
Payer: MEDICAID

## 2022-05-02 PROCEDURE — 97110 THERAPEUTIC EXERCISES: CPT

## 2022-05-02 NOTE — PROGRESS NOTES
Physical Therapy  Daily Treatment Note  Date: 2022  Patient Name: Jyoti Mckeon  MRN: [id number]     :   1970    Subjective:   General  Additional Pertinent Hx: 45 y/o M presents with complaint of back pain. PMH includes HTN, OA  PT Visit Information  PT Insurance Information: BCBS Medicaid (no precert until 20 visits)  Total # of Visits Approved:  (8-12)  Total # of Visits to Date: 5  Plan of Care/Certification Expiration Date: 22  Progress Note Due Date: 22  Subjective  Subjective: I had some pain yesterday but right now it feels alright  Pain Screening  Patient Currently in Pain: Denies       Treatment Activities:  Exercises:      Treatment Reasoning    Exercise 1: Pulleys x 5'  Exercise 2: Rhomboid stretch on pole (varying heights) 5 x 10\"  Exercise 3: L lower trap stretch (start with LUE in scaption, keep straight elbow, and rotate arm down to R hip) 5 x 5\"  Exercise 4: Rodriguez stretch (LUE pulls string) 5 x 5\"  Exercise 5: L stretch (LUE up) 5 x 5\"  Exercise 6: Golfer's stretch L (both with thumb up and thumb down) 4 x 10\"  Exercise 7: Pec stretch on doorway bilat 10 sec x 3 reps  Exercise 8: L upper trap stretch 3 x 10\"  Exercise 9: L levator stretch 3 x 10\"  Exercise 10: Chin tucks x 10  Exercise 11: Subscap stretch with cane (LUE in shoulder flex/abd 90 degrees, shoulder ER, hold cane behind back and pull down/fwd with RUE)  10\" x 3  Exercise 12: Overhead L lat stretch 4 x 10\"  Exercise 13: L sleeper stretch in sidelying/manual subscap stretch 5 sec x 5 reps  Exercise 14: Prone thoracic PA mobs  Exercise 15: Prone I, T, Y x 10 ea  Exercise 16: Prone rows x 10  Exercise 17: IASTM L neck and scapular area x 5 min--not today                           Assessment:   Conditions Requiring Skilled Therapeutic Intervention  Body Structures, Functions, Activity Limitations Requiring Skilled Therapeutic Intervention: Decreased body mechanics; Decreased ROM; Decreased sensation;Decreased posture; Increased pain;Decreased tolerance to work activity  Assessment: Patient progressing nicely to this point and states he feels 85% improved, able to perform subscapularis stretch with cane and showed him how to perform this in corner with elbows low. He had an active trigger point in L subscapularis and was able to obtain a good release performing STM.   Treatment Diagnosis: L scapular pain  Requires PT Follow-Up: Yes      Goals:  Short Term Goals  Time Frame for Short term goals: 4-6 weeks  Short term goal 1: No further radiating pain in L shoulder  Short term goal 2: Demo equal upper trap engagement in sitting/standing (equal shoulder height)  Short term goal 3: Decrease burning and hypersensitivity in L scapular area by at least 50%  Short term goal 4: Improve Oswestry score to 8% impairment or better  Long Term Goals  Time Frame for Long term goals : 3-4 weeks  Long term goal 1: Independent with HEP  Long term goal 2: Improve L shoulder flexion in sit/stand to 180 degrees  Patient Goals   Patient goals : reduce L scapula pain    Plan:    Plan weeks: 4-6 weeks  Current Treatment Recommendations: Strengthening,ROM,Manual Therapy - Joint Manipulation,Manual Therapy - Soft Tissue Mobilization,Home exercise program,Modalities        Therapy Time   Individual Concurrent Group Co-treatment   Time In 0900         Time Out 0941         Minutes 39               Chica Melvin PTA    Electronically signed by Chica Melvin PTA on 5/2/2022 at 9:45 AM

## 2022-05-04 ENCOUNTER — HOSPITAL ENCOUNTER (OUTPATIENT)
Dept: PHYSICAL THERAPY | Age: 52
Setting detail: THERAPIES SERIES
Discharge: HOME | End: 2022-05-04
Payer: MEDICAID

## 2022-05-04 PROCEDURE — 97110 THERAPEUTIC EXERCISES: CPT

## 2022-05-04 NOTE — PROGRESS NOTES
Physical Therapy  Daily Treatment Note  Date: 2022  Patient Name: Samantha Brown  MRN: [id number]     :   1970    Subjective:   General  Additional Pertinent Hx: 47 y/o M presents with complaint of back pain. PMH includes HTN, OA  PT Visit Information  PT Insurance Information: BCBS Medicaid (no precert until 20 visits)  Total # of Visits Approved:  (8-12)  Total # of Visits to Date: 6  Plan of Care/Certification Expiration Date: 22  Progress Note Due Date: 22  Subjective  Subjective: Has gotten alot better since I started this. Pain Screening  Patient Currently in Pain: No       Treatment Activities:  Exercises:      Treatment Reasoning    Exercise 1: Pulleys x 5'  Exercise 2: Rhomboid stretch on pole (varying heights) 5 x 10\"  Exercise 3: L lower trap stretch (start with LUE in scaption, keep straight elbow, and rotate arm down to R hip) 5 x 5\"  Exercise 4: Rodriguez stretch (LUE pulls string) 5 x 5\"  Exercise 5: L stretch (LUE up) 5 x 5\"  Exercise 6: Golfer's stretch L (both with thumb up and thumb down) 4 x 10\"  Exercise 7: Pec stretch on doorway bilat 10 sec x 3 reps  Exercise 8: L upper trap stretch 3 x 10\"  Exercise 9: L levator stretch 3 x 10\"  Exercise 10: Chin tucks x 10  Exercise 11: Subscap stretch with cane (LUE in shoulder flex/abd 90 degrees, shoulder ER, hold cane behind back and pull down/fwd with RUE)  10\" x 3  Exercise 12: Overhead L lat stretch 4 x 10\"  Exercise 13: L sleeper stretch in sidelying/manual subscap stretch 5 sec x 5 reps  Exercise 14: Prone thoracic PA mobs-done  Exercise 15: Prone I, T, Y x 10 ea  Exercise 16: Prone rows x 10  Exercise 17: IASTM L neck and scapular area x 5 min--not today                     HEP issued 2022                              Assessment:   Conditions Requiring Skilled Therapeutic Intervention  Body Structures, Functions, Activity Limitations Requiring Skilled Therapeutic Intervention: Decreased body mechanics; Decreased ROM;Decreased sensation;Decreased posture; Increased pain;Decreased tolerance to work activity  Assessment: Patient did well with todays session. He was able to recall some of his ex. HEP issued today. Patient demonstrated all and verbalized understanding. He relates he is doing much better overall. Rates pain at 0/10 pre session and \"sore\" post session.   Treatment Diagnosis: L scapular pain               Goals:  Short Term Goals  Time Frame for Short term goals: 4-6 weeks  Short term goal 1: No further radiating pain in L shoulder  Short term goal 2: Demo equal upper trap engagement in sitting/standing (equal shoulder height)  Short term goal 3: Decrease burning and hypersensitivity in L scapular area by at least 50%  Short term goal 4: Improve Oswestry score to 8% impairment or better  Long Term Goals  Time Frame for Long term goals : 3-4 weeks  Long term goal 1: Independent with HEP  Long term goal 2: Improve L shoulder flexion in sit/stand to 180 degrees  Patient Goals   Patient goals : reduce L scapula pain    Plan:    Plan  Plan weeks: 4-6 weeks  Current Treatment Recommendations: Strengthening,ROM,Manual Therapy - Joint Manipulation,Manual Therapy - Soft Tissue Mobilization,Home exercise program,Modalities  Timed Code Treatment Minutes: 44 Minutes     Therapy Time   Individual Concurrent Group Co-treatment   Time In 1600         Time Out 1644         Minutes 44         Timed Code Treatment Minutes: [address], PTA       Electronically signed by Wally Pyle PTA on 5/4/2022 at 4:51 PM

## 2022-05-09 ENCOUNTER — HOSPITAL ENCOUNTER (OUTPATIENT)
Dept: PHYSICAL THERAPY | Age: 52
Setting detail: THERAPIES SERIES
End: 2022-05-09
Payer: MEDICAID

## 2022-05-11 ENCOUNTER — HOSPITAL ENCOUNTER (OUTPATIENT)
Dept: PHYSICAL THERAPY | Age: 52
Setting detail: THERAPIES SERIES
Discharge: HOME | End: 2022-05-11
Payer: MEDICAID

## 2022-05-11 PROCEDURE — 97110 THERAPEUTIC EXERCISES: CPT

## 2022-05-11 NOTE — PROGRESS NOTES
Physical Therapy  Daily Treatment Note  Date: 2022  Patient Name: Temo Dao  MRN: [id number]     :   1970    Subjective:   General  Additional Pertinent Hx: 47 y/o M presents with complaint of back pain. PMH includes HTN, OA  PT Visit Information  PT Insurance Information: BCBS Medicaid (no precert until 20 visits)  Total # of Visits Approved:  (8-12)  Total # of Visits to Date: 7  Plan of Care/Certification Expiration Date: 22  Progress Note Due Date: 22  Subjective: yesterday at work i was cooking and it felt like someone shot me in my shoulder, i even yelled it hurt so bad but since then i have had no pain in my L shoulder, it's just weird.   Patient Currently in Pain: No       Treatment Activities:  Exercises:      Treatment Reasoning    Exercise 1: Pulleys x 5'  Exercise 2: Rhomboid stretch on pole (varying heights) 5 x 10\"  Exercise 3: L lower trap stretch (start with LUE in scaption, keep straight elbow, and rotate arm down to R hip) 5 x 5\"  Exercise 4: Rodriguez stretch (LUE pulls string) 5 x 5\"  Exercise 5: L stretch (LUE up) 5 x 5\"  Exercise 6: Golfer's stretch L (both with thumb up and thumb down) 4 x 10\"  Exercise 7: Pec stretch on doorway bilat 10 sec x 3 reps  Exercise 8: L upper trap stretch 3 x 10\"  Exercise 9: L levator stretch 3 x 10\"  Exercise 10: Chin tucks x 10  Exercise 11: Subscap stretch with cane (LUE in shoulder flex/abd 90 degrees, shoulder ER, hold cane behind back and pull down/fwd with RUE)  10\" x 3  Exercise 12: Overhead L lat stretch 4 x 10\"  Exercise 13: L sleeper stretch in sidelying/manual subscap stretch 5 sec x 5 reps  Exercise 14: Prone thoracic PA mobs-done  Exercise 15: Prone I, T, Y x 10 ea  Exercise 16: Prone rows x 10  Exercise 17: IASTM L neck and scapular area x 5 min--not today                     HEP issued 2022                           Assessment:   Conditions Requiring Skilled Therapeutic Intervention  Body Structures, Functions, Activity Limitations Requiring Skilled Therapeutic Intervention: Decreased body mechanics; Decreased ROM; Decreased sensation;Decreased posture; Increased pain;Decreased tolerance to work activity  Assessment: Patient able to go thru this routine w/o any complaintsof pain or discomfort and states he is perform his HEP, if he continues to progress  anticipate d/s soon  Treatment Diagnosis: L scapular pain  Requires PT Follow-Up: Yes             Goals:  Short Term Goals  Time Frame for Short term goals: 4-6 weeks  Short term goal 1: No further radiating pain in L shoulder  Short term goal 2: Demo equal upper trap engagement in sitting/standing (equal shoulder height)  Short term goal 3: Decrease burning and hypersensitivity in L scapular area by at least 50%  Short term goal 4: Improve Oswestry score to 8% impairment or better  Long Term Goals  Time Frame for Long term goals : 3-4 weeks  Long term goal 1: Independent with HEP  Long term goal 2: Improve L shoulder flexion in sit/stand to 180 degrees  Patient Goals   Patient goals : reduce L scapula pain    Plan:    Plan weeks: 4-6 weeks  Current Treatment Recommendations: Strengthening,ROM,Manual Therapy - Joint Manipulation,Manual Therapy - Soft Tissue Mobilization,Home exercise program,Modalities        Therapy Time   Individual Concurrent Group Co-treatment   Time In 6862         Time Out 1631         Minutes 29               Matt Hutson PTA    Electronically signed by Matt Hutson PTA on 5/11/2022 at 4:46 PM

## 2022-05-13 ENCOUNTER — HOSPITAL ENCOUNTER (OUTPATIENT)
Dept: PHYSICAL THERAPY | Age: 52
Setting detail: THERAPIES SERIES
Discharge: HOME | End: 2022-05-13
Payer: MEDICAID

## 2022-05-13 PROCEDURE — 97110 THERAPEUTIC EXERCISES: CPT

## 2022-05-13 ASSESSMENT — PAIN SCALES - GENERAL: PAINLEVEL_OUTOF10: 0

## 2022-05-13 NOTE — PROGRESS NOTES
Physical Therapy  Daily Treatment Note  Date: 2022  Patient Name: Shama Busby  MRN: [id number]     :   1970    Subjective:  Patient states left shoulder is still improving. No problems after his last session, whatever happened in his shoulder at work has seemed to help keep his pain level minimal.           PT Visit Information  Total # of Visits Approved:  (8-12)  Total # of Visits to Date: 8  Plan of Care/Certification Expiration Date: 22  Progress Note Due Date: 22     Pain Assessment  Pain Level: 0       Treatment Activities:                                    Exercises  Exercise 1: Pulleys x 5'  Exercise 2: Rhomboid stretch on pole (varying heights) 5 x 10\"  Exercise 3: L lower trap stretch (start with LUE in scaption, keep straight elbow, and rotate arm down to R hip) 5 x 5\"  Exercise 4: Rodriguez stretch (LUE pulls string) 5 x 5\"  Exercise 5: L stretch (LUE up) 5 x 5\"  Exercise 6: Golfer's stretch L (both with thumb up and thumb down) 4 x 10\"  Exercise 7: Pec stretch on doorway bilat 10 sec x 3 reps  Exercise 8: L upper trap stretch 3 x 10\"  Exercise 9: L levator stretch 3 x 10\"  Exercise 10: Chin tucks x 10  Exercise 11: Subscap stretch with cane (LUE in shoulder flex/abd 90 degrees, shoulder ER, hold cane behind back and pull down/fwd with RUE)  10\" x 3--not today  Exercise 12: Overhead L lat stretch 4 x 10\"  Exercise 13: L sleeper stretch in sidelying/manual subscap stretch 5 sec x 5 reps  Exercise 14: Prone thoracic PA mobs-done  Exercise 15: Prone I, T, Y x 10 ea  Exercise 16: Prone rows x 10  Exercise 17: IASTM L neck and scapular area x 5 min--not today                     HEP issued 2022                               Assessment:   Conditions Requiring Skilled Therapeutic Intervention  Body Structures, Functions, Activity Limitations Requiring Skilled Therapeutic Intervention: Decreased body mechanics; Decreased ROM; Decreased sensation;Decreased posture; Increased pain;Decreased tolerance to work activity  Assessment: Mr. George Gonzalez continues to report significant relief from his original left shoulder pain. He is easily able to tolerate his exercises and reports greater freedom to use his left arm at home and at work. He wishes to attend his next 2 scheduled visits, with reassessment on 5/18. Most likely plan to discharge thereafter.   Treatment Diagnosis: L scapular pain    Goals:  Short Term Goals  Time Frame for Short term goals: 4-6 weeks  Short term goal 1: No further radiating pain in L shoulder  Short term goal 2: Demo equal upper trap engagement in sitting/standing (equal shoulder height)  Short term goal 3: Decrease burning and hypersensitivity in L scapular area by at least 50%  Short term goal 4: Improve Oswestry score to 8% impairment or better  Long Term Goals  Time Frame for Long term goals : 3-4 weeks  Long term goal 1: Independent with HEP  Long term goal 2: Improve L shoulder flexion in sit/stand to 180 degrees  Patient Goals   Patient goals : reduce L scapula pain  Plan:    Plan  Plan weeks: 4-6 weeks  Current Treatment Recommendations: Strengthening,ROM,Manual Therapy - Joint Manipulation,Manual Therapy - Soft Tissue Mobilization,Home exercise program,Modalities  Timed Code Treatment Minutes: 38 Minutes     Therapy Time   Individual Concurrent Group Co-treatment   Time In 3741         Time Out 0486         Minutes 38         Timed Code Treatment Minutes: 38 Minutes  Electronically signed by Viviane Gaucher, PT on 5/13/2022 at 4:50 PM

## 2022-05-16 ENCOUNTER — HOSPITAL ENCOUNTER (OUTPATIENT)
Dept: PHYSICAL THERAPY | Age: 52
Setting detail: THERAPIES SERIES
Discharge: HOME | End: 2022-05-16
Payer: MEDICAID

## 2022-05-16 PROCEDURE — 97110 THERAPEUTIC EXERCISES: CPT

## 2022-05-16 NOTE — PROGRESS NOTES
Physical Therapy  Daily Treatment Note  Date: 2022  Patient Name: Danni Peralta  MRN: [id number]     :   1970      Subjective:      PT Visit Information  Total # of Visits Approved: 10 (8-12)  Total # of Visits to Date: 9  Plan of Care/Certification Expiration Date: 22  Progress Note Due Date: 22  Subjective: Patient reports that he is in no pain this morning and since his episode at work he has had no more pain. Pain Screening  Patient Currently in Pain: No         Treatment Activities:   Exercises  Exercise 1: Pulleys x 5'  Exercise 2: Rhomboid stretch on pole (varying heights) 5 x 10\"  Exercise 3: L lower trap stretch (start with LUE in scaption, keep straight elbow, and rotate arm down to R hip) 5 x 5\"  Exercise 4: Rodriguez stretch (LUE pulls string) 5 x 5\"  Exercise 5: L stretch (LUE up) 5 x 5\"  Exercise 6: Golfer's stretch L (both with thumb up and thumb down) 5 x 10\"  Exercise 7: Pec stretch on doorway bilat 10 sec x 4 reps  Exercise 8: L upper trap stretch 4 x 10\"  Exercise 9: L levator stretch 4 x 10\"  Exercise 10: Chin tucks x 15  Exercise 11: Subscap stretch with cane (LUE in shoulder flex/abd 90 degrees, shoulder ER, hold cane behind back and pull down/fwd with RUE)  10\" x 3--not today  Exercise 12: Overhead L lat stretch 5 x 10\"  Exercise 13: L sleeper stretch in sidelying/manual subscap stretch 5 sec x 5 reps  Exercise 14: Prone thoracic PA mobs-NOT TODAY  Exercise 15: Prone I, T, Y x 15 ea  Exercise 16: Prone rows x 15  Exercise 17: IASTM L neck and scapular area x 5 min--not today                     HEP issued 2022     Assessment:   Conditions Requiring Skilled Therapeutic Intervention  Body Structures, Functions, Activity Limitations Requiring Skilled Therapeutic Intervention: Decreased body mechanics; Decreased ROM; Decreased sensation;Decreased posture; Increased pain;Decreased tolerance to work activity  Assessment: Patient presents with no pain this date and tolerates increased reps well having no c/o pain or issues. Some cueing needs to ensure correct mm engagement and technique for max exercise benefit. He shows good range with stretches and reports no pain throughout tx. Patient is scheduled for a re-assessment at next visit. Will continue per POC and progress as he progresses.   Treatment Diagnosis: L scapular pain  Therapy Prognosis: Good        Goals:Short Term Goals  Time Frame for Short term goals: 4-6 weeks  Short term goal 1: No further radiating pain in L shoulder  Short term goal 2: Demo equal upper trap engagement in sitting/standing (equal shoulder height)  Short term goal 3: Decrease burning and hypersensitivity in L scapular area by at least 50%  Short term goal 4: Improve Oswestry score to 8% impairment or better  Long Term Goals  Time Frame for Long term goals : 3-4 weeks  Long term goal 1: Independent with HEP  Long term goal 2: Improve L shoulder flexion in sit/stand to 180 degrees  Patient Goals   Patient goals : reduce L scapula pain      Plan:    Plan  Plan weeks: 4-6 weeks  Current Treatment Recommendations: Strengthening,ROM,Manual Therapy - Joint Manipulation,Manual Therapy - Soft Tissue Mobilization,Home exercise program,Modalities          Therapy Time   Individual Concurrent Group Co-treatment   Time In 0800         Time Out 0826         Minutes 26                 Electronically signed by Whit Mendoza PTA on 5/16/2022 at 9:33 AM

## 2022-05-18 ENCOUNTER — HOSPITAL ENCOUNTER (OUTPATIENT)
Dept: PHYSICAL THERAPY | Age: 52
Setting detail: THERAPIES SERIES
Discharge: HOME | End: 2022-05-18
Payer: MEDICAID

## 2022-05-18 PROCEDURE — 97110 THERAPEUTIC EXERCISES: CPT

## 2022-05-18 NOTE — PROGRESS NOTES
Physical Therapy: Daily Note/Re-assessment/Discharge   Patient: Rani Miller (28 y.o. male)   Examination Date:   Plan of Care/Certification Expiration Date: 22    No data recorded   :  1970 # of Visits since [facility]:   Visit count could not be calculated. Make sure you are using a visit which is associated with an episode. MRN: [id number]  CSN: [id number] Start of Care Date:   No linked episodes   Insurance: Payor: Benjamin Saldana / Plan: Bala Luu / Product Type: *No Product type* /   Insurance ID: [id number] - (Medicaid Managed) Secondary Insurance (if applicable):    Referring Physician: Ana Boucher MD     PCP: Tati Hennessy MD Visits to Date/Visits Approved: 10 / 10    No Show/Cancelled Appts:   /       Medical Diagnosis: Pain in thoracic spine [M54.6]    Treatment Diagnosis: L scapular pain        SUBJECTIVE EXAMINATION   Pain Level: Pain Screening  Patient Currently in Pain: Denies    Patient Comments: Subjective: Continues to deny pain or hypersensitivity. He is now able to reach overhead for plates at work, as well as complete all needed tasks w/o issue. Feels comfortable to continue on with HEP independently. HEP Compliance: Good        OBJECTIVE EXAMINATION   Restrictions:  No data recorded No data recorded No data recorded      TREATMENT     Pt Education: Plan Comment: D/c from formal services and continue with HEP       ASSESSMENT     Assessment: Assessment: Re-assessment today, as well as last visit. Pt displays no further upper trap substitution, as well as equal ROM bilaterally. He has no pain or symptoms, and his answers on Oswestry scale indicate 0% impairment. He has met all goals and will be d/c from formal services at this time.      Post-Treatment Pain Level:  0    Activity Tolerance: Patient tolerated treatment well    Therapy Prognosis: Good       GOALS   Patient goals : reduce L scapula pain  Short Term Goals Completed by 4-6 weeks Current

## 2022-07-22 ENCOUNTER — OFFICE VISIT (OUTPATIENT)
Age: 52
End: 2022-07-22
Payer: MEDICAID

## 2022-07-22 VITALS
TEMPERATURE: 97.1 F | WEIGHT: 220.6 LBS | HEIGHT: 70 IN | BODY MASS INDEX: 31.58 KG/M2 | SYSTOLIC BLOOD PRESSURE: 130 MMHG | HEART RATE: 105 BPM | OXYGEN SATURATION: 99 % | DIASTOLIC BLOOD PRESSURE: 76 MMHG

## 2022-07-22 DIAGNOSIS — H60.501 ACUTE OTITIS EXTERNA OF RIGHT EAR, UNSPECIFIED TYPE: Primary | ICD-10-CM

## 2022-07-22 DIAGNOSIS — H61.21 IMPACTED CERUMEN OF RIGHT EAR: ICD-10-CM

## 2022-07-22 PROCEDURE — 99213 OFFICE O/P EST LOW 20 MIN: CPT | Performed by: PHYSICIAN ASSISTANT

## 2022-07-22 RX ORDER — CYCLOBENZAPRINE HCL 10 MG
TABLET ORAL
COMMUNITY
Start: 2022-07-17

## 2022-07-22 RX ORDER — NEOMYCIN SULFATE, POLYMYXIN B SULFATE AND HYDROCORTISONE 10; 3.5; 1 MG/ML; MG/ML; [USP'U]/ML
4 SUSPENSION/ DROPS AURICULAR (OTIC) 4 TIMES DAILY
Qty: 10 ML | Refills: 0 | Status: SHIPPED | OUTPATIENT
Start: 2022-07-22 | End: 2022-08-01

## 2022-07-22 ASSESSMENT — ENCOUNTER SYMPTOMS
SINUS PRESSURE: 0
SORE THROAT: 0
EYE PAIN: 0
DIARRHEA: 0
NAUSEA: 0
SINUS PAIN: 0
ALLERGIC/IMMUNOLOGIC NEGATIVE: 1
VOMITING: 0
COUGH: 0
SHORTNESS OF BREATH: 0
ABDOMINAL PAIN: 0

## 2022-07-22 NOTE — PATIENT INSTRUCTIONS
Eardrops sent to the pharmacy apply as directed. Please follow up with PCP or return to clinic if symptoms worsen or fail to improve. Patient verbalized understanding agrees with treatment plan.

## 2022-07-22 NOTE — PROGRESS NOTES
[id number]  [address] [address] [place] [address]  Dept: [phone]  Dept Fax: [phone]  Loc: [phone]    Adelso Tierney is a 46 y.o. male who presents today for his medical conditions/complaints as noted below. Adelso Tierney is complaining of Otalgia (Right ear pressure /fullness )      HPI:   Otalgia   There is pain in the right ear. This is a new problem. The current episode started in the past 7 days. The problem occurs constantly. The problem has been gradually worsening. There has been no fever. The pain is mild. Associated symptoms include hearing loss. Pertinent negatives include no abdominal pain, coughing, diarrhea, ear discharge, headaches, rash, sore throat or vomiting. He has tried acetaminophen for the symptoms. The treatment provided no relief. Past Medical History:   Diagnosis Date    Arthritis     BPH (benign prostatic hyperplasia)     Hypertension        Past Surgical History:   Procedure Laterality Date    WISDOM TOOTH EXTRACTION         Family History   Problem Relation Age of Onset    Breast Cancer Mother     Lung Cancer Maternal Uncle     Diabetes Maternal Grandmother     Brain Cancer Paternal Grandfather        Social History     Tobacco Use    Smoking status: Former     Packs/day: 0.50     Years: 1.00     Pack years: 0.50     Types: Cigarettes     Start date: 200     Quit date: 2021     Years since quittin.8    Smokeless tobacco: Never   Substance Use Topics    Alcohol use: Yes     Comment: Rare        Current Outpatient Medications   Medication Sig Dispense Refill    cyclobenzaprine (FLEXERIL) 10 MG tablet TAKE 1/2-1 TABLET BY MOUTH 3 TIMES A DAY AS NEEDED FOR PAIN OR SPASM      neomycin-polymyxin-hydrocortisone (CORTISPORIN) 3.5-19243-0 otic suspension Place 4 drops into the right ear in the morning and 4 drops at noon and 4 drops in the evening and 4 drops before bedtime.  Do all this for 10 days. 10 mL 0    lisinopril-hydroCHLOROthiazide (PRINZIDE;ZESTORETIC) 20-12.5 MG per tablet Take 1 tablet by mouth daily       tamsulosin (FLOMAX) 0.4 MG capsule Take 1 capsule by mouth daily 20 capsule 0     No current facility-administered medications for this visit. Allergies   Allergen Reactions    Amoxicillin Nausea And Vomiting    Other      Pain medication unknown to patient       Health Maintenance   Topic Date Due    COVID-19 Vaccine (1) Never done    Depression Screen  Never done    HIV screen  Never done    Hepatitis C screen  Never done    DTaP/Tdap/Td vaccine (1 - Tdap) Never done    Colorectal Cancer Screen  Never done    Shingles vaccine (1 of 2) Never done    Flu vaccine (1) 09/01/2022    Lipids  10/20/2026    Hepatitis A vaccine  Aged Out    Hepatitis B vaccine  Aged Out    Hib vaccine  Aged Out    Meningococcal (ACWY) vaccine  Aged Out    Pneumococcal 0-64 years Vaccine  Aged Out       Subjective:   Review of Systems   Constitutional:  Negative for chills, fatigue and fever. HENT:  Positive for ear pain and hearing loss. Negative for congestion, ear discharge, postnasal drip, sinus pressure, sinus pain and sore throat. Eyes:  Negative for pain and visual disturbance. Respiratory:  Negative for cough and shortness of breath. Cardiovascular:  Negative for chest pain. Gastrointestinal:  Negative for abdominal pain, diarrhea, nausea and vomiting. Endocrine: Negative for cold intolerance and heat intolerance. Genitourinary:  Negative for frequency, hematuria and urgency. Musculoskeletal:  Negative for myalgias. Skin:  Negative for rash. Allergic/Immunologic: Negative. Neurological:  Negative for syncope, weakness, light-headedness and headaches. Hematological: Negative. Psychiatric/Behavioral: Negative. Objective    Physical Exam  Vitals and nursing note reviewed. Constitutional:       General: He is not in acute distress. Appearance: Normal appearance. He is not ill-appearing. HENT:      Head: Normocephalic and atraumatic. Right Ear: There is impacted cerumen. Left Ear: Tympanic membrane, ear canal and external ear normal.      Ears:      Comments: Erythematous and swollen right canal with some impacted cerumen. Nose: Nose normal.      Mouth/Throat:      Mouth: Mucous membranes are moist.      Pharynx: Oropharynx is clear. Eyes:      Extraocular Movements: Extraocular movements intact. Conjunctiva/sclera: Conjunctivae normal.   Cardiovascular:      Rate and Rhythm: Normal rate and regular rhythm. Pulses: Normal pulses. Heart sounds: Normal heart sounds. Pulmonary:      Effort: Pulmonary effort is normal.      Breath sounds: Normal breath sounds. No wheezing. Abdominal:      General: Abdomen is flat. Bowel sounds are normal. There is no distension. Palpations: Abdomen is soft. Tenderness: There is no abdominal tenderness. Musculoskeletal:         General: Normal range of motion. Cervical back: Normal range of motion and neck supple. No tenderness. Skin:     General: Skin is warm and dry. Findings: No erythema. Neurological:      General: No focal deficit present. Mental Status: He is alert and oriented to person, place, and time. Psychiatric:         Mood and Affect: Mood normal.         Behavior: Behavior normal.       /76   Pulse (!) 105   Temp 97.1 °F (36.2 °C)   Ht 5' 10\" (1.778 m)   Wt 220 lb 9.6 oz (100.1 kg)   SpO2 99%   BMI 31.65 kg/m²     Assessment         Diagnosis Orders   1. Acute otitis externa of right ear, unspecified type  neomycin-polymyxin-hydrocortisone (CORTISPORIN) 3.5-13701-4 otic suspension      2. Impacted cerumen of right ear  Ear wax removal          Plan   Ear wash- cerumen removed and pt hearing better. Eardrops sent to the pharmacy apply as directed. Please follow up with PCP or return to clinic if symptoms worsen or fail to improve.   Patient verbalized understanding agrees with treatment plan. Orders Placed This Encounter   Procedures    Ear wax removal         No results found for this visit on 07/22/22. Orders Placed This Encounter   Medications    neomycin-polymyxin-hydrocortisone (CORTISPORIN) 3.5-18480-4 otic suspension     Sig: Place 4 drops into the right ear in the morning and 4 drops at noon and 4 drops in the evening and 4 drops before bedtime. Do all this for 10 days. Dispense:  10 mL     Refill:  0        New Prescriptions    NEOMYCIN-POLYMYXIN-HYDROCORTISONE (CORTISPORIN) 3.5-04927-8 OTIC SUSPENSION    Place 4 drops into the right ear in the morning and 4 drops at noon and 4 drops in the evening and 4 drops before bedtime. Do all this for 10 days. Return if symptoms worsen or fail to improve. Discussed use, benefits, and side effects of any prescribed medications. All patient questions were answered. Patient voiced understanding of care plan. Patient was given educational materials - see patient instructions below. Patient Instructions   Eardrops sent to the pharmacy apply as directed. Please follow up with PCP or return to clinic if symptoms worsen or fail to improve. Patient verbalized understanding agrees with treatment plan.       Electronically signed by Viktor Cano PA-C on 7/22/2022 at 4:19 PM

## 2022-07-22 NOTE — PROGRESS NOTES
RIGHT EAR IRRIGATED WITH  ELEPHANT EAR IRRIGATION SYSTEM. LARGE AMOUNT OF EAR WAX REMOVED WITH WATER AND CURAGE.  PT TOLERATED WELL WITH PROVIDER RECHECKING EARS POST PROCEDURE

## 2023-12-29 LAB
ANION GAP SERPL CALCULATED.3IONS-SCNC: 13 MMOL/L (ref 7–19)
BUN SERPL-MCNC: 22 MG/DL (ref 6–20)
CALCIUM SERPL-MCNC: 9.9 MG/DL (ref 8.6–10)
CHLORIDE SERPL-SCNC: 99 MMOL/L (ref 98–111)
CO2 SERPL-SCNC: 25 MMOL/L (ref 22–29)
CREAT SERPL-MCNC: 1.1 MG/DL (ref 0.5–1.2)
GLUCOSE SERPL-MCNC: 101 MG/DL (ref 74–109)
POTASSIUM SERPL-SCNC: 4.1 MMOL/L (ref 3.5–5)
SODIUM SERPL-SCNC: 137 MMOL/L (ref 136–145)

## 2024-01-29 ENCOUNTER — OFFICE VISIT (OUTPATIENT)
Dept: UROLOGY | Age: 54
End: 2024-01-29
Payer: MEDICAID

## 2024-01-29 VITALS — BODY MASS INDEX: 30.49 KG/M2 | HEIGHT: 70 IN | TEMPERATURE: 97.8 F | WEIGHT: 213 LBS

## 2024-01-29 DIAGNOSIS — N40.1 BENIGN PROSTATIC HYPERPLASIA (BPH) WITH STRAINING ON URINATION: ICD-10-CM

## 2024-01-29 DIAGNOSIS — Z87.442 HISTORY OF KIDNEY STONES: ICD-10-CM

## 2024-01-29 DIAGNOSIS — R97.20 ELEVATED PSA: Primary | ICD-10-CM

## 2024-01-29 DIAGNOSIS — R39.16 BENIGN PROSTATIC HYPERPLASIA (BPH) WITH STRAINING ON URINATION: ICD-10-CM

## 2024-01-29 LAB
APPEARANCE FLUID: CLEAR
BILIRUBIN, POC: NORMAL
BLOOD URINE, POC: NORMAL
CLARITY, POC: CLEAR
COLOR, POC: YELLOW
GLUCOSE URINE, POC: NORMAL
KETONES, POC: NORMAL
LEUKOCYTE EST, POC: NORMAL
NITRITE, POC: NORMAL
PH, POC: 5.5
POST VOID RESIDUAL (PVR): 0 ML
PROTEIN, POC: NORMAL
SPECIFIC GRAVITY, POC: 1.02
UROBILINOGEN, POC: 0.2

## 2024-01-29 PROCEDURE — 81002 URINALYSIS NONAUTO W/O SCOPE: CPT | Performed by: NURSE PRACTITIONER

## 2024-01-29 PROCEDURE — 51798 US URINE CAPACITY MEASURE: CPT | Performed by: NURSE PRACTITIONER

## 2024-01-29 PROCEDURE — 99204 OFFICE O/P NEW MOD 45 MIN: CPT | Performed by: NURSE PRACTITIONER

## 2024-01-29 RX ORDER — TAMSULOSIN HYDROCHLORIDE 0.4 MG/1
0.4 CAPSULE ORAL DAILY
Qty: 30 CAPSULE | Refills: 4 | Status: SHIPPED | OUTPATIENT
Start: 2024-01-29

## 2024-01-29 ASSESSMENT — ENCOUNTER SYMPTOMS
ABDOMINAL PAIN: 0
NAUSEA: 0
ABDOMINAL DISTENTION: 0
BACK PAIN: 0
VOMITING: 0

## 2024-01-29 NOTE — PROGRESS NOTES
Linden Carlson is a 54 y.o. male who presents today   Chief Complaint   Patient presents with    New Patient     I am here today for elevated PSA       Elevated PSA: Patient is here with an elevated PSA. He has no personal history and no family history of prostate cancer. He has a prior genitourinary history of urolithiasis.   Previous PSA values are :  Lab Results   Component Value Date/Time    PSA 6.09 12/22/2023 07:13 AM    PSA 2.90 04/04/2022 02:45 PM     PSA is overall elevated at 6.0 from 2.9.    BPH  Patient also with a history of BPH not currently maintained on any medications.  AUA score today 21/35 May complains of incomplete bladder emptying, frequency, intermittency, urgency, weak stream, straining, nocturia x 3.  He is mostly dissatisfied with his quality life.  Also complains of postvoid dribbling.  Denies any gross hematuria.    Patient did have a ureteral stone in 2021 he reports he never passed the stone.  I have no new imaging he denies any flank pain.  No gross hematuria    Past Medical History:   Diagnosis Date    Arthritis     BPH (benign prostatic hyperplasia)     Hypertension        Past Surgical History:   Procedure Laterality Date    WISDOM TOOTH EXTRACTION         Current Outpatient Medications   Medication Sig Dispense Refill    tamsulosin (FLOMAX) 0.4 MG capsule Take 1 capsule by mouth daily 30 capsule 4    cyclobenzaprine (FLEXERIL) 10 MG tablet TAKE 1/2-1 TABLET BY MOUTH 3 TIMES A DAY AS NEEDED FOR PAIN OR SPASM      lisinopril-hydroCHLOROthiazide (PRINZIDE;ZESTORETIC) 20-12.5 MG per tablet Take 1 tablet by mouth daily       No current facility-administered medications for this visit.       Allergies   Allergen Reactions    Amoxicillin Nausea And Vomiting    Meperidine Hcl Other (See Comments)    Other      Pain medication unknown to patient       Social History     Socioeconomic History    Marital status: Single     Spouse name: None    Number of children: None    Years of

## 2024-02-09 LAB
HBA1C MFR BLD: 5.8 % (ref 4–6)
TSH SERPL DL<=0.005 MIU/L-ACNC: 1.48 UIU/ML (ref 0.35–5.5)
VIT B12 SERPL-MCNC: 337 PG/ML (ref 211–946)

## 2024-02-23 DIAGNOSIS — R97.20 ELEVATED PSA: ICD-10-CM

## 2024-02-25 LAB
PSA FREE MFR SERPL: 32 %
PSA FREE SERPL-MCNC: 1 NG/ML
PSA SERPL-MCNC: 3.1 NG/ML (ref 0–4)

## 2024-03-01 ENCOUNTER — OFFICE VISIT (OUTPATIENT)
Dept: UROLOGY | Age: 54
End: 2024-03-01

## 2024-03-01 VITALS — HEIGHT: 70 IN | BODY MASS INDEX: 31.32 KG/M2 | TEMPERATURE: 97.2 F | WEIGHT: 218.8 LBS

## 2024-03-01 DIAGNOSIS — R39.16 BENIGN PROSTATIC HYPERPLASIA (BPH) WITH STRAINING ON URINATION: ICD-10-CM

## 2024-03-01 DIAGNOSIS — Z87.442 HISTORY OF KIDNEY STONES: ICD-10-CM

## 2024-03-01 DIAGNOSIS — N40.1 BENIGN PROSTATIC HYPERPLASIA (BPH) WITH STRAINING ON URINATION: ICD-10-CM

## 2024-03-01 DIAGNOSIS — R97.20 ELEVATED PSA: Primary | ICD-10-CM

## 2024-03-01 LAB
BACTERIA URINE, POC: 0
BILIRUBIN URINE: 0 MG/DL
BLOOD, URINE: NEGATIVE
CASTS URINE, POC: 0
CLARITY: CLEAR
COLOR: YELLOW
CRYSTALS URINE, POC: 0
EPI CELLS URINE, POC: 0
GLUCOSE URINE: NORMAL
KETONES, URINE: NEGATIVE
LEUKOCYTE EST, POC: NORMAL
NITRITE, URINE: NEGATIVE
PH UA: 6 (ref 4.5–8)
POST VOID RESIDUAL (PVR): 68 ML
PROTEIN UA: NEGATIVE
RBC URINE, POC: 0
SPECIFIC GRAVITY UA: 1.01 (ref 1–1.03)
UROBILINOGEN, URINE: NORMAL
WBC URINE, POC: 2
YEAST URINE, POC: 0

## 2024-03-01 RX ORDER — GABAPENTIN 100 MG/1
CAPSULE ORAL
COMMUNITY
Start: 2024-02-01

## 2024-03-01 RX ORDER — TAMSULOSIN HYDROCHLORIDE 0.4 MG/1
0.4 CAPSULE ORAL DAILY
Qty: 90 CAPSULE | Refills: 3 | Status: SHIPPED | OUTPATIENT
Start: 2024-03-01

## 2024-03-01 ASSESSMENT — ENCOUNTER SYMPTOMS
VOMITING: 0
NAUSEA: 0
ABDOMINAL DISTENTION: 0
BACK PAIN: 0
ABDOMINAL PAIN: 0

## 2024-03-01 NOTE — PROGRESS NOTES
Linden Carlson is a 54 y.o. male who presents today   Chief Complaint   Patient presents with    Follow-up     I am here today for a 1 month PSA/BPH follow up        Elevated PSA: Patient is here with an elevated PSA. He has no personal history and no family history of prostate cancer.  He has no prior genitourinary history of hematospermia, prostatitis, UTI. Previous PSA values are :  Lab Results   Component Value Date    PSA 3.1 02/23/2024    PSA 6.09 (H) 12/22/2023    PSA 2.90 04/04/2022     PSA is overall decreased from 6.09 to 3.1.  32% for any given the patient a 9% chance of finding cancer on biopsy.    BPH  Patient with history of BPH previous SAMUEL enlarged nonsuspicious.  At last visit I placed the patient on Flomax he is having nocturia 4-5 times per night.  AUA score today 13/35 with main complaints of incomplete emptying frequency, intermittency, urgency, weak stream, nocturia x 2 denies any straining.  Reports overall improvement since starting Flomax quality life is mostly satisfied.  Denies any side effects.     Previous history of stones, currently asymptomatic     Past Medical History:   Diagnosis Date    Arthritis     BPH (benign prostatic hyperplasia)     Hypertension        Past Surgical History:   Procedure Laterality Date    WISDOM TOOTH EXTRACTION         Current Outpatient Medications   Medication Sig Dispense Refill    gabapentin (NEURONTIN) 100 MG capsule       tamsulosin (FLOMAX) 0.4 MG capsule Take 1 capsule by mouth daily 90 capsule 3    lisinopril-hydroCHLOROthiazide (PRINZIDE;ZESTORETIC) 20-12.5 MG per tablet Take 1 tablet by mouth daily       No current facility-administered medications for this visit.       Allergies   Allergen Reactions    Amoxicillin Nausea And Vomiting    Demerol Hcl [Meperidine]     Meperidine Hcl Other (See Comments)    Other      Pain medication unknown to patient       Social History     Socioeconomic History    Marital status: Single     Spouse name: None

## 2024-11-01 LAB
ALBUMIN SERPL-MCNC: 4.5 G/DL (ref 3.5–5.2)
ALP SERPL-CCNC: 73 U/L (ref 40–129)
ALT SERPL-CCNC: 28 U/L (ref 5–41)
ANION GAP SERPL CALCULATED.3IONS-SCNC: 12 MMOL/L (ref 7–19)
AST SERPL-CCNC: 20 U/L (ref 5–40)
BASOPHILS # BLD: 0.1 K/UL (ref 0–0.2)
BASOPHILS NFR BLD: 1 % (ref 0–1)
BILIRUB SERPL-MCNC: 0.4 MG/DL (ref 0.2–1.2)
BUN SERPL-MCNC: 41 MG/DL (ref 6–20)
CALCIUM SERPL-MCNC: 9.6 MG/DL (ref 8.6–10)
CHLORIDE SERPL-SCNC: 102 MMOL/L (ref 98–111)
CO2 SERPL-SCNC: 24 MMOL/L (ref 22–29)
CREAT SERPL-MCNC: 1.2 MG/DL (ref 0.7–1.2)
EOSINOPHIL # BLD: 0.5 K/UL (ref 0–0.6)
EOSINOPHIL NFR BLD: 5.3 % (ref 0–5)
ERYTHROCYTE [DISTWIDTH] IN BLOOD BY AUTOMATED COUNT: 12.3 % (ref 11.5–14.5)
GLUCOSE SERPL-MCNC: 110 MG/DL (ref 70–99)
HCT VFR BLD AUTO: 45.4 % (ref 42–52)
HGB BLD-MCNC: 15.2 G/DL (ref 14–18)
IMM GRANULOCYTES # BLD: 0.1 K/UL
LYMPHOCYTES # BLD: 2.9 K/UL (ref 1.1–4.5)
LYMPHOCYTES NFR BLD: 29.4 % (ref 20–40)
MCH RBC QN AUTO: 30.8 PG (ref 27–31)
MCHC RBC AUTO-ENTMCNC: 33.5 G/DL (ref 33–37)
MCV RBC AUTO: 91.9 FL (ref 80–94)
MONOCYTES # BLD: 0.9 K/UL (ref 0–0.9)
MONOCYTES NFR BLD: 9.1 % (ref 0–10)
NEUTROPHILS # BLD: 5.4 K/UL (ref 1.5–7.5)
NEUTS SEG NFR BLD: 54.6 % (ref 50–65)
PLATELET # BLD AUTO: 353 K/UL (ref 130–400)
PMV BLD AUTO: 9.1 FL (ref 9.4–12.4)
POTASSIUM SERPL-SCNC: 5.2 MMOL/L (ref 3.5–5)
PROT SERPL-MCNC: 7.6 G/DL (ref 6.4–8.3)
RBC # BLD AUTO: 4.94 M/UL (ref 4.7–6.1)
SODIUM SERPL-SCNC: 138 MMOL/L (ref 136–145)
VIT B12 SERPL-MCNC: 475 PG/ML (ref 232–1245)
WBC # BLD AUTO: 9.8 K/UL (ref 4.8–10.8)

## 2025-03-07 DIAGNOSIS — R97.20 ELEVATED PSA: ICD-10-CM

## 2025-03-07 DIAGNOSIS — N40.1 BENIGN PROSTATIC HYPERPLASIA (BPH) WITH STRAINING ON URINATION: ICD-10-CM

## 2025-03-07 DIAGNOSIS — R39.16 BENIGN PROSTATIC HYPERPLASIA (BPH) WITH STRAINING ON URINATION: ICD-10-CM

## 2025-03-07 LAB
ALBUMIN SERPL-MCNC: 4.4 G/DL (ref 3.5–5.2)
ALP SERPL-CCNC: 64 U/L (ref 40–129)
ALT SERPL-CCNC: 35 U/L (ref 10–50)
ANION GAP SERPL CALCULATED.3IONS-SCNC: 12 MMOL/L (ref 8–16)
AST SERPL-CCNC: 22 U/L (ref 10–50)
BASOPHILS # BLD: 0.1 K/UL (ref 0–0.2)
BASOPHILS NFR BLD: 0.5 % (ref 0–1)
BILIRUB SERPL-MCNC: 0.7 MG/DL (ref 0.2–1.2)
BUN SERPL-MCNC: 31 MG/DL (ref 6–20)
CALCIUM SERPL-MCNC: 9.7 MG/DL (ref 8.6–10)
CHLORIDE SERPL-SCNC: 99 MMOL/L (ref 98–107)
CO2 SERPL-SCNC: 26 MMOL/L (ref 22–29)
CREAT SERPL-MCNC: 1.3 MG/DL (ref 0.7–1.2)
EOSINOPHIL # BLD: 0.3 K/UL (ref 0–0.6)
EOSINOPHIL NFR BLD: 2.2 % (ref 0–5)
ERYTHROCYTE [DISTWIDTH] IN BLOOD BY AUTOMATED COUNT: 12.2 % (ref 11.5–14.5)
GLUCOSE SERPL-MCNC: 105 MG/DL (ref 70–99)
HCT VFR BLD AUTO: 45.3 % (ref 42–52)
HGB BLD-MCNC: 15.5 G/DL (ref 14–18)
IMM GRANULOCYTES # BLD: 0.1 K/UL
LYMPHOCYTES # BLD: 3.1 K/UL (ref 1.1–4.5)
LYMPHOCYTES NFR BLD: 20.5 % (ref 20–40)
MCH RBC QN AUTO: 31 PG (ref 27–31)
MCHC RBC AUTO-ENTMCNC: 34.2 G/DL (ref 33–37)
MCV RBC AUTO: 90.6 FL (ref 80–94)
MONOCYTES # BLD: 1.4 K/UL (ref 0–0.9)
MONOCYTES NFR BLD: 9.2 % (ref 0–10)
NEUTROPHILS # BLD: 10 K/UL (ref 1.5–7.5)
NEUTS SEG NFR BLD: 67 % (ref 50–65)
PLATELET # BLD AUTO: 359 K/UL (ref 130–400)
PMV BLD AUTO: 8.9 FL (ref 9.4–12.4)
POTASSIUM SERPL-SCNC: 4.4 MMOL/L (ref 3.5–5.1)
PROT SERPL-MCNC: 7.5 G/DL (ref 6.4–8.3)
PSA SERPL-MCNC: 3.86 NG/ML (ref 0–4)
RBC # BLD AUTO: 5 M/UL (ref 4.7–6.1)
SODIUM SERPL-SCNC: 137 MMOL/L (ref 136–145)
WBC # BLD AUTO: 14.9 K/UL (ref 4.8–10.8)

## 2025-03-14 ENCOUNTER — OFFICE VISIT (OUTPATIENT)
Dept: UROLOGY | Age: 55
End: 2025-03-14
Payer: MEDICAID

## 2025-03-14 VITALS — BODY MASS INDEX: 30.72 KG/M2 | HEIGHT: 70 IN | TEMPERATURE: 97.3 F | WEIGHT: 214.6 LBS

## 2025-03-14 DIAGNOSIS — R97.20 ELEVATED PSA: ICD-10-CM

## 2025-03-14 DIAGNOSIS — N40.1 BENIGN PROSTATIC HYPERPLASIA (BPH) WITH STRAINING ON URINATION: Primary | ICD-10-CM

## 2025-03-14 DIAGNOSIS — Z87.442 HISTORY OF KIDNEY STONES: ICD-10-CM

## 2025-03-14 DIAGNOSIS — R39.16 BENIGN PROSTATIC HYPERPLASIA (BPH) WITH STRAINING ON URINATION: Primary | ICD-10-CM

## 2025-03-14 LAB
BACTERIA URINE, POC: NORMAL
BILIRUBIN URINE: 0 MG/DL
BLOOD, URINE: NEGATIVE
CASTS URINE, POC: NORMAL
CLARITY, UA: CLEAR
COLOR, UA: YELLOW
CRYSTALS URINE, POC: NORMAL
EPI CELLS URINE, POC: NORMAL
GLUCOSE URINE: NORMAL
KETONES, URINE: NEGATIVE
LEUKOCYTE EST, POC: NORMAL
NITRITE, URINE: NEGATIVE
PH UA: 6.5 (ref 4.5–8)
PROTEIN UA: NEGATIVE
RBC URINE, POC: NORMAL
SPECIFIC GRAVITY UA: 1.01 (ref 1–1.03)
UROBILINOGEN, URINE: NORMAL
WBC URINE, POC: 5
YEAST URINE, POC: NORMAL

## 2025-03-14 PROCEDURE — 51798 US URINE CAPACITY MEASURE: CPT | Performed by: NURSE PRACTITIONER

## 2025-03-14 PROCEDURE — 81001 URINALYSIS AUTO W/SCOPE: CPT | Performed by: NURSE PRACTITIONER

## 2025-03-14 PROCEDURE — 99214 OFFICE O/P EST MOD 30 MIN: CPT | Performed by: NURSE PRACTITIONER

## 2025-03-14 RX ORDER — TAMSULOSIN HYDROCHLORIDE 0.4 MG/1
0.4 CAPSULE ORAL DAILY
Qty: 90 CAPSULE | Refills: 3 | Status: SHIPPED | OUTPATIENT
Start: 2025-03-14

## 2025-03-14 RX ORDER — GABAPENTIN 300 MG/1
CAPSULE ORAL
COMMUNITY
Start: 2025-03-13

## 2025-03-14 ASSESSMENT — ENCOUNTER SYMPTOMS
VOMITING: 0
NAUSEA: 0
BACK PAIN: 0
ABDOMINAL PAIN: 0
ABDOMINAL DISTENTION: 0

## 2025-03-14 NOTE — PROGRESS NOTES
Linden Carlson is a 55 y.o. male who presents today   Chief Complaint   Patient presents with    Follow-up     I am here today for my 1 year BPH follow up.     Elevated PSA: Patient is here with an elevated PSA. He has no personal history and no family history of prostate cancer.  He has no prior genitourinary history of hematospermia, prostatitis, UTI. Previous PSA values are :  Lab Results   Component Value Date    PSA 3.86 03/07/2025    PSA 3.1 02/23/2024    PSA 6.09 (H) 12/22/2023    PSA 2.90 04/04/2022     Patient states the day before his PSA he did have some prostate discomfort took an ibuprofen which did relieve his discomfort however could be why his PSA has slightly increased to 3.8.    BPH  History of BPH.  Current AUA score 9/35 May complains of frequency, urgency, weak stream, nocturia x 2.  Denies any incomplete emptying, intermittency, straining.  Mostly satisfied with his quality life score of 2.  On average will void every 1-2 hours throughout the day a moderate amount.  He has been told since a young child that he has had a small bladder.  He is also on a diuretic.  Currently maintained on Flomax 0.4 mg daily.  Overall he is doing well no issues.    Previous history of stones, currently asymptomatic       Past Medical History:   Diagnosis Date    Arthritis     BPH (benign prostatic hyperplasia)     Hypertension        Past Surgical History:   Procedure Laterality Date    WISDOM TOOTH EXTRACTION         Current Outpatient Medications   Medication Sig Dispense Refill    gabapentin (NEURONTIN) 300 MG capsule       tamsulosin (FLOMAX) 0.4 MG capsule Take 1 capsule by mouth daily 90 capsule 3    lisinopril-hydroCHLOROthiazide (PRINZIDE;ZESTORETIC) 20-12.5 MG per tablet Take 1 tablet by mouth daily       No current facility-administered medications for this visit.       Allergies   Allergen Reactions    Amoxicillin Nausea And Vomiting    Demerol Hcl [Meperidine]     Meperidine Hcl Other (See